# Patient Record
Sex: FEMALE | Race: WHITE | Employment: OTHER | URBAN - METROPOLITAN AREA
[De-identification: names, ages, dates, MRNs, and addresses within clinical notes are randomized per-mention and may not be internally consistent; named-entity substitution may affect disease eponyms.]

---

## 2017-01-04 NOTE — PROGRESS NOTES
Patient pre-assessment complete for [facility] poss with DR Chente Padron scheduled for 17 at 8:00am, arrival time 6:00am - HYDRATION. Patient verified using . Patient instructed to bring all home medications in labeled bottles on the day of procedure. NPO status reinforced. Patient informed to take a full dose aspirin 325mg  or 81 mg x 4 on the day of procedure. Patient instructed to HOLD lasix & tradjenta. Instructed they can take all other medications excluding vitamins & supplements. Patient verbalizes understanding of all instructions & denies any questions at this time.       Encouraged increased fluids today & informed of plan for hydration & possible delayed procedure time, voiced understanding

## 2017-01-05 ENCOUNTER — HOSPITAL ENCOUNTER (OUTPATIENT)
Dept: CARDIAC CATH/INVASIVE PROCEDURES | Age: 82
Setting detail: OBSERVATION
Discharge: HOME | End: 2017-01-06
Attending: INTERNAL MEDICINE | Admitting: INTERNAL MEDICINE
Payer: MEDICARE

## 2017-01-05 ENCOUNTER — HOSPITAL ENCOUNTER (OUTPATIENT)
Dept: CARDIAC CATH/INVASIVE PROCEDURES | Age: 82
Discharge: HOME | End: 2017-01-05
Payer: MEDICARE

## 2017-01-05 VITALS — HEART RATE: 64 BPM | DIASTOLIC BLOOD PRESSURE: 61 MMHG | SYSTOLIC BLOOD PRESSURE: 128 MMHG | RESPIRATION RATE: 20 BRPM

## 2017-01-05 PROBLEM — I25.118 CORONARY ARTERY DISEASE OF NATIVE ARTERY OF NATIVE HEART WITH STABLE ANGINA PECTORIS (HCC): Status: ACTIVE | Noted: 2017-01-05

## 2017-01-05 LAB
ANION GAP BLD CALC-SCNC: 10 MMOL/L (ref 7–16)
ATRIAL RATE: 65 BPM
ATRIAL RATE: 67 BPM
BUN SERPL-MCNC: 21 MG/DL (ref 8–23)
CALCIUM SERPL-MCNC: 9 MG/DL (ref 8.3–10.4)
CALCULATED P AXIS, ECG09: 55 DEGREES
CALCULATED P AXIS, ECG09: 58 DEGREES
CALCULATED R AXIS, ECG10: -6 DEGREES
CALCULATED R AXIS, ECG10: 4 DEGREES
CALCULATED T AXIS, ECG11: 46 DEGREES
CALCULATED T AXIS, ECG11: 52 DEGREES
CHLORIDE SERPL-SCNC: 104 MMOL/L (ref 98–107)
CO2 SERPL-SCNC: 25 MMOL/L (ref 21–32)
CREAT SERPL-MCNC: 1.46 MG/DL (ref 0.6–1)
DIAGNOSIS, 93000: NORMAL
DIAGNOSIS, 93000: NORMAL
DIASTOLIC BP, ECG02: NORMAL MMHG
DIASTOLIC BP, ECG02: NORMAL MMHG
GLUCOSE SERPL-MCNC: 148 MG/DL (ref 65–100)
P-R INTERVAL, ECG05: 148 MS
P-R INTERVAL, ECG05: 148 MS
POTASSIUM SERPL-SCNC: 4.3 MMOL/L (ref 3.5–5.1)
Q-T INTERVAL, ECG07: 428 MS
Q-T INTERVAL, ECG07: 438 MS
QRS DURATION, ECG06: 92 MS
QRS DURATION, ECG06: 94 MS
QTC CALCULATION (BEZET), ECG08: 445 MS
QTC CALCULATION (BEZET), ECG08: 462 MS
SODIUM SERPL-SCNC: 139 MMOL/L (ref 136–145)
SYSTOLIC BP, ECG01: NORMAL MMHG
SYSTOLIC BP, ECG01: NORMAL MMHG
VENTRICULAR RATE, ECG03: 65 BPM
VENTRICULAR RATE, ECG03: 67 BPM

## 2017-01-05 PROCEDURE — C1894 INTRO/SHEATH, NON-LASER: HCPCS

## 2017-01-05 PROCEDURE — 74011250637 HC RX REV CODE- 250/637: Performed by: INTERNAL MEDICINE

## 2017-01-05 PROCEDURE — 74011636320 HC RX REV CODE- 636/320: Performed by: INTERNAL MEDICINE

## 2017-01-05 PROCEDURE — C1887 CATHETER, GUIDING: HCPCS

## 2017-01-05 PROCEDURE — 74011000258 HC RX REV CODE- 258: Performed by: INTERNAL MEDICINE

## 2017-01-05 PROCEDURE — 77030015766

## 2017-01-05 PROCEDURE — 99218 HC RM OBSERVATION: CPT

## 2017-01-05 PROCEDURE — C1725 CATH, TRANSLUMIN NON-LASER: HCPCS

## 2017-01-05 PROCEDURE — 77030029997 HC DEV COM RDL R BND TELE -B

## 2017-01-05 PROCEDURE — 93458 L HRT ARTERY/VENTRICLE ANGIO: CPT

## 2017-01-05 PROCEDURE — C1769 GUIDE WIRE: HCPCS

## 2017-01-05 PROCEDURE — 92928 PRQ TCAT PLMT NTRAC ST 1 LES: CPT

## 2017-01-05 PROCEDURE — 51701 INSERT BLADDER CATHETER: CPT

## 2017-01-05 PROCEDURE — 77030004534 HC CATH ANGI DX INFN CARD -A

## 2017-01-05 PROCEDURE — 74011000250 HC RX REV CODE- 250: Performed by: INTERNAL MEDICINE

## 2017-01-05 PROCEDURE — 93005 ELECTROCARDIOGRAM TRACING: CPT | Performed by: INTERNAL MEDICINE

## 2017-01-05 PROCEDURE — 80048 BASIC METABOLIC PNL TOTAL CA: CPT | Performed by: INTERNAL MEDICINE

## 2017-01-05 PROCEDURE — G0378 HOSPITAL OBSERVATION PER HR: HCPCS

## 2017-01-05 PROCEDURE — 74011250636 HC RX REV CODE- 250/636: Performed by: INTERNAL MEDICINE

## 2017-01-05 PROCEDURE — 74011250636 HC RX REV CODE- 250/636

## 2017-01-05 PROCEDURE — C1874 STENT, COATED/COV W/DEL SYS: HCPCS

## 2017-01-05 RX ORDER — NITROGLYCERIN 0.4 MG/1
0.4 TABLET SUBLINGUAL
Status: DISCONTINUED | OUTPATIENT
Start: 2017-01-05 | End: 2017-01-06 | Stop reason: HOSPADM

## 2017-01-05 RX ORDER — NITROGLYCERIN 0.4 MG/1
0.4 TABLET SUBLINGUAL
Status: DISCONTINUED | OUTPATIENT
Start: 2017-01-05 | End: 2017-01-05 | Stop reason: SDUPTHER

## 2017-01-05 RX ORDER — DIAZEPAM 5 MG/1
5 TABLET ORAL ONCE
Status: ACTIVE | OUTPATIENT
Start: 2017-01-05 | End: 2017-01-05

## 2017-01-05 RX ORDER — SODIUM CHLORIDE 0.9 % (FLUSH) 0.9 %
5-10 SYRINGE (ML) INJECTION AS NEEDED
Status: DISCONTINUED | OUTPATIENT
Start: 2017-01-05 | End: 2017-01-06 | Stop reason: HOSPADM

## 2017-01-05 RX ORDER — MIDAZOLAM HYDROCHLORIDE 1 MG/ML
.5-2 INJECTION, SOLUTION INTRAMUSCULAR; INTRAVENOUS
Status: DISCONTINUED | OUTPATIENT
Start: 2017-01-05 | End: 2017-01-05 | Stop reason: HOSPADM

## 2017-01-05 RX ORDER — CLOPIDOGREL BISULFATE 75 MG/1
75 TABLET ORAL DAILY
Status: DISCONTINUED | OUTPATIENT
Start: 2017-01-06 | End: 2017-01-06 | Stop reason: HOSPADM

## 2017-01-05 RX ORDER — HEPARIN SODIUM 200 [USP'U]/100ML
3 INJECTION, SOLUTION INTRAVENOUS CONTINUOUS
Status: DISCONTINUED | OUTPATIENT
Start: 2017-01-05 | End: 2017-01-06 | Stop reason: HOSPADM

## 2017-01-05 RX ORDER — GUAIFENESIN 100 MG/5ML
81 LIQUID (ML) ORAL DAILY
Status: DISCONTINUED | OUTPATIENT
Start: 2017-01-06 | End: 2017-01-05 | Stop reason: SDUPTHER

## 2017-01-05 RX ORDER — CLOPIDOGREL 300 MG/1
600 TABLET, FILM COATED ORAL
Status: COMPLETED | OUTPATIENT
Start: 2017-01-05 | End: 2017-01-05

## 2017-01-05 RX ORDER — ATORVASTATIN CALCIUM 40 MG/1
80 TABLET, FILM COATED ORAL DAILY
Status: DISCONTINUED | OUTPATIENT
Start: 2017-01-06 | End: 2017-01-05 | Stop reason: SDUPTHER

## 2017-01-05 RX ORDER — FENTANYL CITRATE 50 UG/ML
25-50 INJECTION, SOLUTION INTRAMUSCULAR; INTRAVENOUS
Status: DISCONTINUED | OUTPATIENT
Start: 2017-01-05 | End: 2017-01-05 | Stop reason: HOSPADM

## 2017-01-05 RX ORDER — SODIUM CHLORIDE 0.9 % (FLUSH) 0.9 %
5-10 SYRINGE (ML) INJECTION EVERY 8 HOURS
Status: DISCONTINUED | OUTPATIENT
Start: 2017-01-05 | End: 2017-01-06 | Stop reason: HOSPADM

## 2017-01-05 RX ORDER — SODIUM CHLORIDE 9 MG/ML
1 INJECTION, SOLUTION INTRAVENOUS AS NEEDED
Status: DISPENSED | OUTPATIENT
Start: 2017-01-05 | End: 2017-01-06

## 2017-01-05 RX ORDER — LORAZEPAM 1 MG/1
1 TABLET ORAL
Status: DISCONTINUED | OUTPATIENT
Start: 2017-01-05 | End: 2017-01-06 | Stop reason: HOSPADM

## 2017-01-05 RX ORDER — ZOLPIDEM TARTRATE 5 MG/1
5 TABLET ORAL
Status: DISCONTINUED | OUTPATIENT
Start: 2017-01-05 | End: 2017-01-06 | Stop reason: HOSPADM

## 2017-01-05 RX ORDER — MAG HYDROX/ALUMINUM HYD/SIMETH 200-200-20
30 SUSPENSION, ORAL (FINAL DOSE FORM) ORAL ONCE
Status: COMPLETED | OUTPATIENT
Start: 2017-01-05 | End: 2017-01-05

## 2017-01-05 RX ORDER — CARVEDILOL 6.25 MG/1
6.25 TABLET ORAL 2 TIMES DAILY WITH MEALS
Status: DISCONTINUED | OUTPATIENT
Start: 2017-01-05 | End: 2017-01-06 | Stop reason: HOSPADM

## 2017-01-05 RX ORDER — AMLODIPINE BESYLATE 5 MG/1
5 TABLET ORAL DAILY
Status: DISCONTINUED | OUTPATIENT
Start: 2017-01-06 | End: 2017-01-06 | Stop reason: HOSPADM

## 2017-01-05 RX ORDER — ACETAMINOPHEN 325 MG/1
650 TABLET ORAL
Status: DISCONTINUED | OUTPATIENT
Start: 2017-01-05 | End: 2017-01-06 | Stop reason: HOSPADM

## 2017-01-05 RX ORDER — MORPHINE SULFATE 2 MG/ML
2 INJECTION, SOLUTION INTRAMUSCULAR; INTRAVENOUS
Status: DISCONTINUED | OUTPATIENT
Start: 2017-01-05 | End: 2017-01-06 | Stop reason: HOSPADM

## 2017-01-05 RX ORDER — ASPIRIN 81 MG/1
81 TABLET ORAL DAILY
Status: DISCONTINUED | OUTPATIENT
Start: 2017-01-06 | End: 2017-01-06 | Stop reason: HOSPADM

## 2017-01-05 RX ORDER — SODIUM CHLORIDE 9 MG/ML
75 INJECTION, SOLUTION INTRAVENOUS CONTINUOUS
Status: DISCONTINUED | OUTPATIENT
Start: 2017-01-05 | End: 2017-01-06 | Stop reason: HOSPADM

## 2017-01-05 RX ORDER — ATORVASTATIN CALCIUM 80 MG/1
80 TABLET, FILM COATED ORAL DAILY
Status: DISCONTINUED | OUTPATIENT
Start: 2017-01-06 | End: 2017-01-06 | Stop reason: HOSPADM

## 2017-01-05 RX ORDER — LIDOCAINE HYDROCHLORIDE 20 MG/ML
1-20 INJECTION, SOLUTION INFILTRATION; PERINEURAL
Status: DISCONTINUED | OUTPATIENT
Start: 2017-01-05 | End: 2017-01-06 | Stop reason: HOSPADM

## 2017-01-05 RX ORDER — GUAIFENESIN 100 MG/5ML
324 LIQUID (ML) ORAL ONCE
Status: ACTIVE | OUTPATIENT
Start: 2017-01-05 | End: 2017-01-05

## 2017-01-05 RX ADMIN — HEPARIN SODIUM 2 ML: 10000 INJECTION, SOLUTION INTRAVENOUS; SUBCUTANEOUS at 10:35

## 2017-01-05 RX ADMIN — FENTANYL CITRATE 25 MCG: 50 INJECTION, SOLUTION INTRAMUSCULAR; INTRAVENOUS at 10:31

## 2017-01-05 RX ADMIN — Medication 10 ML: at 20:48

## 2017-01-05 RX ADMIN — FENTANYL CITRATE 25 MCG: 50 INJECTION, SOLUTION INTRAMUSCULAR; INTRAVENOUS at 10:48

## 2017-01-05 RX ADMIN — SODIUM CHLORIDE 1 ML/KG/HR: 900 INJECTION, SOLUTION INTRAVENOUS at 07:24

## 2017-01-05 RX ADMIN — HEPARIN SODIUM 3 ML/HR: 200 INJECTION, SOLUTION INTRAVENOUS at 10:13

## 2017-01-05 RX ADMIN — IOPAMIDOL 175 ML: 755 INJECTION, SOLUTION INTRAVENOUS at 11:11

## 2017-01-05 RX ADMIN — ALUMINUM HYDROXIDE, MAGNESIUM HYDROXIDE, AND SIMETHICONE 30 ML: 200; 200; 20 SUSPENSION ORAL at 11:16

## 2017-01-05 RX ADMIN — LIDOCAINE HYDROCHLORIDE 60 MG: 20 INJECTION, SOLUTION INFILTRATION; PERINEURAL at 10:34

## 2017-01-05 RX ADMIN — BIVALIRUDIN 1.75 MG/KG/HR: 250 INJECTION, POWDER, LYOPHILIZED, FOR SOLUTION INTRAVENOUS at 10:45

## 2017-01-05 RX ADMIN — CARVEDILOL 6.25 MG: 6.25 TABLET ORAL at 20:43

## 2017-01-05 RX ADMIN — MIDAZOLAM HYDROCHLORIDE 1 MG: 1 INJECTION, SOLUTION INTRAMUSCULAR; INTRAVENOUS at 10:31

## 2017-01-05 RX ADMIN — CLOPIDOGREL BISULFATE 600 MG: 300 TABLET, FILM COATED ORAL at 11:15

## 2017-01-05 NOTE — PROGRESS NOTES
TRANSFER - OUT REPORT:    Verbal report given to Wisam Daniel RN on [employer]  being transferred to 328(unit) for routine post - op       Report consisted of patients Situation, Background, Assessment and   Recommendations(SBAR). Information from the following report(s) SBAR, Procedure Summary, Intake/Output, MAR, Accordion, Recent Results and Med Rec Status was reviewed with the receiving nurse. Lines:   Peripheral IV 01/05/17 Right Antecubital (Active)       Peripheral IV 01/05/17 Right Arm (Active)        Opportunity for questions and clarification was provided. Patient transported with:   Monitor  Registered Nurse       TR band off. Site dressing clean dry and intact. No hematoma and no bleeding. . Assessed at the bedside by Wisam Daniel RN for handoff of care.

## 2017-01-05 NOTE — PROGRESS NOTES
Patient received to [address] [place] [address] room # 1  Ambulatory from [facility]. Patient scheduled for LHC today with Dr Laurence Parker. Procedure reviewed & questions answered, voiced good understanding consent obtained & placed on chart. All medications and medical history reviewed. Will prep patient per orders. Patient & family updated on plan of care.     Patient states she took  mg at 0500 am

## 2017-01-05 NOTE — PROGRESS NOTES
TRANSFER - OUT REPORT:    Verbal report given to Costella Alpers, RN on [employer]  being transferred to [facility] for routine progression of care       Report consisted of patients Situation, Background, Assessment and Recommendations(SBAR). Information from the following report(s) SBAR, Kardex, Procedure Summary and MAR was reviewed with the receiving nurse. Opportunity for questions and clarification was provided.       [facility] with Dr Edwin Newman  1 versed  50 fentanyl  One LAVONNE LAD  angiomax until completion  600 plavix  30 mylanta  Right radial R band 12ml at 1110

## 2017-01-05 NOTE — PROGRESS NOTES
TRANSFER - IN REPORT:    Verbal report received from Nilo Quiros RN on Boom Linares  being received from [address] [place] [address] for routine progression of care. Report consisted of patients Situation, Background, Assessment and   Recommendations(SBAR). Information from the following reports was reviewed: Kardex, Procedure Summary, MAR and Recent Results. Opportunity for questions and clarification was provided. Assessment completed upon patients arrival to unit and care assumed. Patient received to room 328 and assessment completed. Patient connected to telemetry monitor and eagle with BP cycling every 15 minutes. Patient oriented to room and plan of care reviewed. Patient voiced understanding of keeping wrist relatively still. Right radial site benign, dressing dry and intact, no hematoma. Patient aware to use call light to communicate needs. Instructed patient to not use arm for any pushing or pulling. Admission skin assessment completed with second RN and reveals the following: gauze and tegaderm on right wrist.  Clean dry and intact without hematoma. Pt coccyx and heels are without redness or breakdown.

## 2017-01-05 NOTE — PROGRESS NOTES
Report received from [facility] RN. Procedural findings communicated. Intra procedural  medication administration reviewed. Progression of care discussed. Patient received into [facility] [place] [facility] post PCI.      Access site without bleeding or swelling yes    Dressing dry and intact yes    Patient instructed to limit movement to right upper extremity    Routine post procedural vital signs and site assessment initiated yes

## 2017-01-05 NOTE — PROGRESS NOTES
Pt own meds taken to pharmacy and the ones that were not ordered were returned to family. Pt has family in room and has completed dinner.   Carvedilol will be re timed as med is in pharmacy being checked and is unavailable

## 2017-01-05 NOTE — IP AVS SNAPSHOT
Rajeshrohit Deborah 
 
 
 [address] [facility]  [address] 
[phone] Patient: Ginna Farfan MRN: [id number] SBC:[birth year] You are allergic to the following No active allergies Recent Documentation Height Weight Breastfeeding? BMI Smoking Status 1.6 m 72.7 kg No 28.4 kg/m2 Former Smoker Emergency Contacts Name Discharge Info Relation Home Work Mobile Yue Huynh  Other Relative [6] [phone] About your hospitalization You were admitted on:  January 5, 2017 You last received care in the:  [facility] 3 CLINICAL OBSERVATION You were discharged on:  January 6, 2017 Unit phone number:  [phone] Why you were hospitalized Your primary diagnosis was:  Coronary Artery Disease Of Native Artery Of Native Heart With Stable Angina Pectoris (Hcc) Your diagnoses also included:  Dyslipidemia, Diabetes Mellitus (Hcc), Chronic Systolic Heart Failure (Hcc) Providers Seen During Your Hospitalizations Provider Role Specialty Primary office phone Isaias Mcbride MD Attending Provider Cardiology [phone] Your Primary Care Physician (PCP) Primary Care Physician Office Phone Office Fax Emmanuel Prince [phone] Follow-up Information Follow up With Details Comments Contact Info Mariella Garza MD On 1/19/2017 at 10:20am [address] [place] [address] [id number] [place] [address] 160 
[phone] Isaias Mcbride MD On 1/30/2017 Monday 1/30 at [facility] office [address] [place] [address] 
[phone] Your Appointments Monday January 30, 2017  9:00 AM [facility] HOSPITAL FOLLOW-UP with Isaias Mcbride MD  
[facility] Cardiology ([address] [place] [address]) 2 Levine, Susan. \Hospital Has a New Name and Outlook.\"" 
[address] [place] [address]  
[phone] Current Discharge Medication List  
  
START taking these medications Dose & Instructions Dispensing Information Comments Morning Noon Evening Bedtime  
 clopidogrel 75 mg Tab Commonly known as:  PLAVIX Your next dose is: Today, Tomorrow Other:  _________ Dose:  75 mg Take 1 Tab by mouth daily. Quantity:  30 Tab Refills:  11 CONTINUE these medications which have NOT CHANGED Dose & Instructions Dispensing Information Comments Morning Noon Evening Bedtime  
 amLODIPine 5 mg tablet Commonly known as:  Catarina Colon Your next dose is: Today, Tomorrow Other:  _________ Dose:  5 mg Take 1 Tab by mouth daily. Quantity:  30 Tab Refills:  11  
     
   
   
   
  
 aspirin 81 mg chewable tablet Your next dose is: Today, Tomorrow Other:  _________ Dose:  81 mg Take 81 mg by mouth daily. Refills:  0  
     
   
   
   
  
 atorvastatin 80 mg tablet Commonly known as:  LIPITOR Your next dose is: Today, Tomorrow Other:  _________ Dose:  80 mg Take 1 Tab by mouth daily. Quantity:  90 Tab Refills:  3  
     
   
   
   
  
 carvedilol 6.25 mg tablet Commonly known as:  Cori Florissant Your next dose is: Today, Tomorrow Other:  _________ Dose:  6.25 mg Take 1 Tab by mouth two (2) times daily (with meals). Quantity:  180 Tab Refills:  3 cholecalciferol 1,000 unit Cap Commonly known as:  VITAMIN D3 Your next dose is: Today, Tomorrow Other:  _________ Take  by mouth daily. Refills:  0  
     
   
   
   
  
 furosemide 40 mg tablet Commonly known as:  LASIX Your next dose is: Today, Tomorrow Other:  _________ Dose:  40 mg Take 1 Tab by mouth. daily Quantity:  30 Tab Refills:  11  
     
   
   
   
  
 linagliptin 5 mg tablet Commonly known as:  Sandy Plump Your next dose is: Today, Tomorrow Other:  _________ Dose:  5 mg Take 5 mg by mouth daily. Refills:  0  
     
   
   
   
  
 nitroglycerin 0.4 mg SL tablet Commonly known as:  NITROSTAT Your next dose is: Today, Tomorrow Other:  _________ Dose:  0.4 mg  
1 Tab by SubLINGual route every five (5) minutes as needed for Chest Pain. Quantity:  100 Tab Refills:  prn  
     
   
   
   
  
 zolpidem 5 mg tablet Commonly known as:  AMBIEN Your next dose is: Today, Tomorrow Other:  _________ Take  by mouth nightly as needed for Sleep. Refills:  0 Where to Get Your Medications Information on where to get these meds will be given to you by the nurse or doctor. ! Ask your nurse or doctor about these medications  
  clopidogrel 75 mg Tab Discharge Instructions DISCHARGE SUMMARY from Nurse The following personal items are in your possession at time of discharge: 
 
Dental Appliances: Lowers, Uppers, With patient Visual Aid: None Hearing Aids/Status: At home Home Medications: Sent to pharmacy Other Valuables: Cell Phone PATIENT INSTRUCTIONS: 
 
 
F-face looks uneven A-arms unable to move or move unevenly S-speech slurred or non-existent T-time-call 911 as soon as signs and symptoms begin-DO NOT go Back to bed or wait to see if you get better-TIME IS BRAIN. Warning Signs of HEART ATTACK Call 911 if you have these symptoms: 
? Chest discomfort.  Most heart attacks involve discomfort in the center of the chest that lasts more than a few minutes, or that goes away and comes back. It can feel like uncomfortable pressure, squeezing, fullness, or pain. ? Discomfort in other areas of the upper body. Symptoms can include pain or discomfort in one or both arms, the back, neck, jaw, or stomach. ? Shortness of breath with or without chest discomfort. ? Other signs may include breaking out in a cold sweat, nausea, or lightheadedness. Don't wait more than five minutes to call [address]! Fast action can save your life. Calling 911 is almost always the fastest way to get lifesaving treatment. Emergency Medical Services staff can begin treatment when they arrive  up to an hour sooner than if someone gets to the hospital by car. The discharge information has been reviewed with the patient. The patient verbalized understanding. Discharge medications reviewed with the patient and appropriate educational materials and side effects teaching were provided. Percutaneous Coronary Intervention: What to Expect at [facility] Percutaneous coronary intervention (PCI) is the name for procedures that are used to open a narrowed or blocked coronary artery. The two most common PCI procedures are coronary angioplasty and coronary stent placement. Your groin or arm may have a bruise and feel sore for a day or two after a percutaneous coronary intervention (PCI). You can do light activities around the house, but nothing strenuous for several days. This care sheet gives you a general idea about how long it will take for you to recover. But each person recovers at a different pace. Follow the steps below to get better as quickly as possible. How can you care for yourself at home? Activity · Do not do strenuous exercise and do not lift, pull, or push anything heavy until your doctor says it is okay. This may be for a day or two. You can walk around the house and do light activity, such as cooking.  
· You may shower 24 to 48 hours after the procedure, if your doctor okays it. Pat the incision dry. Do not take a bath for 1 week, or until your doctor tells you it is okay. · If the catheter was placed in your groin, try not to walk up stairs for the first couple of days. · If the catheter was placed in your arm near your wrist, do not bend your wrist deeply for the first couple of days. Be careful using your hand to get into and out of a chair or bed. · If your doctor recommends it, get more exercise. Walking is a good choice. Bit by bit, increase the amount you walk every day. Try for at least 30 minutes on most days of the week. Diet · Drink plenty of fluids to help your body flush out the dye. If you have kidney, heart, or liver disease and have to limit fluids, talk with your doctor before you increase the amount of fluids you drink. · Keep eating a heart-healthy diet that has lots of fruits, vegetables, and whole grains. If you have not been eating this way, talk to your doctor. You also may want to talk to a dietitian. This expert can help you to learn about healthy foods and plan meals. Medicines · Your doctor will tell you if and when you can restart your medicines. He or she will also give you instructions about taking any new medicines. · If you take blood thinners, such as warfarin (Coumadin), clopidogrel (Plavix), or aspirin, be sure to talk to your doctor. He or she will tell you if and when to start taking those medicines again. Make sure that you understand exactly what your doctor wants you to do. · Your doctor will prescribe blood-thinning medicines. You will likely take aspirin plus another antiplatelet, such as clopidogrel (Plavix). It is very important that you take these medicines exactly as directed. These medicines help keep the coronary artery open and reduce your risk of a heart attack. · Call your doctor if you think you are having a problem with your medicine. Care of the catheter site · For 1 or 2 days, keep a bandage over the spot where the catheter was inserted. The bandage probably will fall off in this time. · Put ice or a cold pack on the area for 10 to 20 minutes at a time to help with soreness or swelling. Put a thin cloth between the ice and your skin. Follow-up care is a key part of your treatment and safety. Be sure to make and go to all appointments, and call your doctor if you are having problems. It's also a good idea to know your test results and keep a list of the medicines you take. When should you call for help? Call 911 anytime you think you may need emergency care. For example, call if: 
· You passed out (lost consciousness). · You have severe trouble breathing. · You have sudden chest pain and shortness of breath, or you cough up blood. · You have symptoms of a heart attack, such as: ¨ Chest pain or pressure. ¨ Sweating. ¨ Shortness of breath. ¨ Nausea or vomiting. ¨ Pain that spreads from the chest to the neck, jaw, or one or both shoulders or arms. ¨ Dizziness or lightheadedness. ¨ A fast or uneven pulse. After calling 911, chew 1 adult-strength aspirin. Wait for an ambulance. Do not try to drive yourself. · You have been diagnosed with angina, and you have angina symptoms that do not go away with rest or are not getting better within 5 minutes after you take one dose of nitroglycerin. Call your doctor now or seek immediate medical care if: 
· You are bleeding from the area where the catheter was put in your artery. · You have a fast-growing, painful lump at the catheter site. · You have signs of infection, such as: 
¨ Increased pain, swelling, warmth, or redness. ¨ Red streaks leading from the catheter site. ¨ Pus draining from the catheter site. ¨ A fever. · Your leg or arm looks blue or feels cold, numb, or tingly. Watch closely for changes in your health, and be sure to contact your doctor if you have any problems. Where can you learn more? Go to http://rochelle-lindsay.info/. Enter X745 in the search box to learn more about \"Percutaneous Coronary Intervention: What to Expect at Home. \" Current as of: January 27, 2016 Content Version: 11.1 © 5828-0261 [employer]. Care instructions adapted under license by [employer] (which disclaims liability or warranty for this information). If you have questions about a medical condition or this instruction, always ask your healthcare professional. [address] any warranty or liability for your use of this information. Avoiding Triggers With Heart Failure: Care Instructions Your Care Instructions Triggers are anything that make your heart failure flare up. A flare-up is also called \"sudden heart failure\" or \"acute heart failure. \" When you have a flare-up, fluid builds up in your lungs, and you have problems breathing. You might need to go to the hospital. By watching for changes in your condition and avoiding triggers, you can prevent heart failure flare-ups. Follow-up care is a key part of your treatment and safety. Be sure to make and go to all appointments, and call your doctor if you are having problems. It's also a good idea to know your test results and keep a list of the medicines you take. How can you care for yourself at home? Watch for changes in your weight and condition · Weigh yourself without clothing at the same time each day. Record your weight. Call your doctor if you gain 3 pounds or more in 2 to 3 days. A sudden weight gain may mean that your heart failure is getting worse. · Keep a daily record of your symptoms. Write down any changes in how you feel, such as new shortness of breath, cough, or problems eating. Also record if your ankles are more swollen than usual and if you have to urinate in the night more often. Note anything that you ate or did that could have triggered these changes. Limit sodium Sodium causes your body to hold on to water, making it harder for your heart to pump. People get most of their sodium from processed foods. Fast food and restaurant meals also tend to be very high in sodium. · Your doctor may suggest that you limit sodium to 2,000 milligrams (mg) a day or less. That is less than 1 teaspoon of salt a day, including all the salt you eat in cooking or in packaged foods. · Read food labels on cans and food packages. They tell you how much sodium you get in one serving. Check the serving size. If you eat more than one serving, you are getting more sodium. · Be aware that sodium can come in forms other than salt, including monosodium glutamate (MSG), sodium citrate, and sodium bicarbonate (baking soda). MSG is often added to Asian food. You can sometimes ask for food without MSG or salt. · Slowly reducing salt will help you adjust to the taste. Take the salt shaker off the table. · Flavor your food with garlic, lemon juice, onion, vinegar, herbs, and spices instead of salt. Do not use soy sauce, steak sauce, onion salt, garlic salt, mustard, or ketchup on your food, unless it is labeled \"low-sodium\" or \"low-salt. \" 
· Make your own salad dressings, sauces, and ketchup without adding salt. · Use fresh or frozen ingredients, instead of canned ones, whenever you can. Choose low-sodium canned goods. · Eat less processed food and food from restaurants, including fast food. Exercise as directed Moderate, regular exercise is very good for your heart. It improves your blood flow and helps control your weight. But too much exercise can stress your heart and cause a heart failure flare-up. · Check with your doctor before you start an exercise program. 
· Walking is an easy way to get exercise. Start out slowly. Gradually increase the length and pace of your walk. Swimming, riding a bike, and using a treadmill are also good forms of exercise. · When you exercise, watch for signs that your heart is working too hard. You are pushing yourself too hard if you cannot talk while you are exercising. If you become short of breath or dizzy or have chest pain, stop, sit down, and rest. 
· Do not exercise when you do not feel well. Take medicines correctly · Take your medicines exactly as prescribed. Call your doctor if you think you are having a problem with your medicine. · Make a list of all the medicines you take. Include those prescribed to you by other doctors and any over-the-counter medicines, vitamins, or supplements you take. Take this list with you when you go to any doctor. · Take your medicines at the same time every day. It may help you to post a list of all the medicines you take every day and what time of day you take them. · Make taking your medicine as simple as you can. Plan times to take your medicines when you are doing other things, such as eating a meal or getting ready for bed. This will make it easier to remember to take your medicines. · Get organized. Use helpful tools, such as daily or weekly pill containers. When should you call for help? Call 911 if you have symptoms of sudden heart failure such as: 
· You have severe trouble breathing. · You cough up pink, foamy mucus. · You have a new irregular or rapid heartbeat. Call your doctor now or seek immediate medical care if: 
· You have new or increased shortness of breath. · You are dizzy or lightheaded, or you feel like you may faint. · You have sudden weight gain, such as 3 pounds or more in 2 to 3 days. · You have increased swelling in your legs, ankles, or feet. · You are suddenly so tired or weak that you cannot do your usual activities. Watch closely for changes in your health, and be sure to contact your doctor if you develop new symptoms. Where can you learn more? Go to http://rochelle-lindsay.info/. Enter Z803 in the search box to learn more about \"Avoiding Triggers With Heart Failure: Care Instructions. \" Current as of: April 27, 2016 Content Version: 11.1 © 2006-2016 [employer]. Care instructions adapted under license by [employer] (which disclaims liability or warranty for this information). If you have questions about a medical condition or this instruction, always ask your healthcare professional. [address] any warranty or liability for your use of this information. Discharge Orders None I-frontdesk Announcement We are excited to announce that we are making your provider's discharge notes available to you in I-frontdesk. You will see these notes when they are completed and signed by the physician that discharged you from your recent hospital stay. If you have any questions or concerns about any information you see in I-frontdesk, please call the Health Information Department where you were seen or reach out to your Primary Care Provider for more information about your plan of care. General Information Please provide this summary of care documentation to your next provider. Patient Signature:  ____________________________________________________________ Date:  ____________________________________________________________  
  
[facility] Provider Signature:  ____________________________________________________________ Date:  ____________________________________________________________

## 2017-01-05 NOTE — PROCEDURES
[facility] M[facility] 44       Name:  Ayla Hand   MR#:  [id number]   :  1933   Account #:  [de-identified]   Date of Adm:  2017       DATE OF PROCEDURE: 2017    CLINICAL INFORMATION: Patient with progressively worsening   angina, Phoenix class 3-4 now, worse with exertion, better with   rest chest pain and shortness breath with known to mid LAD   stenosis that has been managed medically, but in spite of   maximum medical therapy, patient having increased angina. PROCEDURE DETAILS: After informed consent was obtained, a 6-  Mohawk sheath was placed in the right radial artery. A radial   cocktail was given by protocol. A 5-Mohawk Tiger catheter and   angled pigtail were used. TR band was used at the end of the   procedure. FINDINGS: The left main coronary artery is in normal anatomic   position, free of significant disease. It bifurcates into LAD   and circumflex system. The LAD has 20% proximal narrowing in the mid portion of the   vessel after the takeoff of a large diagonal branch. There is an   85% narrowing of the distal LAD and diagonal branch is small but   free of any high-grade narrowing. The large mid vessel diagonal   branch is free of any high-grade narrowing. The circumflex is a large vessel terminating in a large distal   obtuse marginal with a small more proximal obtuse marginal   branch, there is no atherosclerosis seen in the circumflex   system. The right coronary artery is a dominant vessel in normal   anatomic position. There is diffuse narrowing up to 20% to 30%   in the proximal segment. The PDA and posterolateral obtuse   marginal branches are small and free of significant disease. Left ventricular function appears improved from previous cath   films and ejection fraction is now 55. Left ventricular end-  diastolic pressure is 9 mmHg. Opening aortic pressure is 144/67. There is no gradient across the aortic valve. After Angiomax was given, a Prowater wire was placed in the   distal LAD. The lesion was predilated with 2.5 x 12 NC TREK   stented with a 2.5 x 12 Synergy drug-eluting stent and then   postdilated with the same 2.5 NC balloon with inflations to 14   atmospheres. There was 0% residual and GUERA-3 flow. CONCLUSION:   1. Successful angioplasty and stenting of the mid left anterior   descending. 2. Preserved left ventricular systolic function, which has   improved from previous cath film.         MD KRYSTEN Ewing / Chandni Gallegos   D:  01/05/2017   11:28   T:  01/05/2017   11:40   Job #:  [id number]

## 2017-01-06 VITALS
TEMPERATURE: 97.6 F | WEIGHT: 160.3 LBS | DIASTOLIC BLOOD PRESSURE: 54 MMHG | HEIGHT: 63 IN | SYSTOLIC BLOOD PRESSURE: 120 MMHG | BODY MASS INDEX: 28.4 KG/M2 | OXYGEN SATURATION: 94 % | RESPIRATION RATE: 16 BRPM | HEART RATE: 75 BPM

## 2017-01-06 LAB
ANION GAP BLD CALC-SCNC: 8 MMOL/L (ref 7–16)
ATRIAL RATE: 69 BPM
BASOPHILS # BLD AUTO: 0 K/UL (ref 0–0.2)
BASOPHILS # BLD: 0 % (ref 0–2)
BUN SERPL-MCNC: 19 MG/DL (ref 8–23)
CALCIUM SERPL-MCNC: 8.9 MG/DL (ref 8.3–10.4)
CALCULATED P AXIS, ECG09: 73 DEGREES
CALCULATED R AXIS, ECG10: 12 DEGREES
CALCULATED T AXIS, ECG11: 67 DEGREES
CHLORIDE SERPL-SCNC: 107 MMOL/L (ref 98–107)
CHOLEST SERPL-MCNC: 130 MG/DL
CO2 SERPL-SCNC: 28 MMOL/L (ref 21–32)
CREAT SERPL-MCNC: 1.27 MG/DL (ref 0.6–1)
DIAGNOSIS, 93000: NORMAL
DIASTOLIC BP, ECG02: NORMAL MMHG
DIFFERENTIAL METHOD BLD: ABNORMAL
EOSINOPHIL # BLD: 0.2 K/UL (ref 0–0.8)
EOSINOPHIL NFR BLD: 3 % (ref 0.5–7.8)
ERYTHROCYTE [DISTWIDTH] IN BLOOD BY AUTOMATED COUNT: 15 % (ref 11.9–14.6)
GLUCOSE SERPL-MCNC: 115 MG/DL (ref 65–100)
HCT VFR BLD AUTO: 37.4 % (ref 35.8–46.3)
HDLC SERPL-MCNC: 32 MG/DL (ref 40–60)
HDLC SERPL: 4.1 {RATIO}
HGB BLD-MCNC: 11.9 G/DL (ref 11.7–15.4)
IMM GRANULOCYTES # BLD: 0 K/UL (ref 0–0.5)
IMM GRANULOCYTES NFR BLD AUTO: 0.2 % (ref 0–5)
LDLC SERPL CALC-MCNC: 58.4 MG/DL
LIPID PROFILE,FLP: ABNORMAL
LYMPHOCYTES # BLD AUTO: 35 % (ref 13–44)
LYMPHOCYTES # BLD: 2.2 K/UL (ref 0.5–4.6)
MCH RBC QN AUTO: 27.5 PG (ref 26.1–32.9)
MCHC RBC AUTO-ENTMCNC: 31.8 G/DL (ref 31.4–35)
MCV RBC AUTO: 86.4 FL (ref 79.6–97.8)
MONOCYTES # BLD: 0.4 K/UL (ref 0.1–1.3)
MONOCYTES NFR BLD AUTO: 7 % (ref 4–12)
NEUTS SEG # BLD: 3.5 K/UL (ref 1.7–8.2)
NEUTS SEG NFR BLD AUTO: 55 % (ref 43–78)
P-R INTERVAL, ECG05: 152 MS
PLATELET # BLD AUTO: 267 K/UL (ref 150–450)
PMV BLD AUTO: 10.1 FL (ref 10.8–14.1)
POTASSIUM SERPL-SCNC: 4.3 MMOL/L (ref 3.5–5.1)
Q-T INTERVAL, ECG07: 410 MS
QRS DURATION, ECG06: 94 MS
QTC CALCULATION (BEZET), ECG08: 439 MS
RBC # BLD AUTO: 4.33 M/UL (ref 4.05–5.25)
SODIUM SERPL-SCNC: 143 MMOL/L (ref 136–145)
SYSTOLIC BP, ECG01: NORMAL MMHG
TRIGL SERPL-MCNC: 198 MG/DL (ref 35–150)
VENTRICULAR RATE, ECG03: 69 BPM
VLDLC SERPL CALC-MCNC: 39.6 MG/DL (ref 6–23)
WBC # BLD AUTO: 6.3 K/UL (ref 4.3–11.1)

## 2017-01-06 PROCEDURE — 99218 HC RM OBSERVATION: CPT

## 2017-01-06 PROCEDURE — 36415 COLL VENOUS BLD VENIPUNCTURE: CPT | Performed by: INTERNAL MEDICINE

## 2017-01-06 PROCEDURE — 74011250637 HC RX REV CODE- 250/637: Performed by: INTERNAL MEDICINE

## 2017-01-06 PROCEDURE — G0378 HOSPITAL OBSERVATION PER HR: HCPCS

## 2017-01-06 PROCEDURE — 80048 BASIC METABOLIC PNL TOTAL CA: CPT | Performed by: INTERNAL MEDICINE

## 2017-01-06 PROCEDURE — 85025 COMPLETE CBC W/AUTO DIFF WBC: CPT | Performed by: INTERNAL MEDICINE

## 2017-01-06 PROCEDURE — 93005 ELECTROCARDIOGRAM TRACING: CPT | Performed by: INTERNAL MEDICINE

## 2017-01-06 PROCEDURE — 80061 LIPID PANEL: CPT | Performed by: INTERNAL MEDICINE

## 2017-01-06 RX ORDER — CLOPIDOGREL BISULFATE 75 MG/1
75 TABLET ORAL DAILY
Qty: 30 TAB | Refills: 11 | Status: SHIPPED | OUTPATIENT
Start: 2017-01-06 | End: 2017-05-04 | Stop reason: SDUPTHER

## 2017-01-06 RX ADMIN — CARVEDILOL 6.25 MG: 6.25 TABLET ORAL at 08:34

## 2017-01-06 RX ADMIN — AMLODIPINE BESYLATE 5 MG: 5 TABLET ORAL at 08:34

## 2017-01-06 RX ADMIN — Medication 10 ML: at 05:56

## 2017-01-06 RX ADMIN — ASPIRIN 81 MG: 81 TABLET ORAL at 08:33

## 2017-01-06 RX ADMIN — CLOPIDOGREL BISULFATE 75 MG: 75 TABLET ORAL at 08:32

## 2017-01-06 NOTE — PROGRESS NOTES
Discharge instructions reviewed with patient. Prescriptions given for Plavix and med info sheets provided for all new medications. Opportunity for questions provided. Patient voiced understanding of all discharge instructions. Patient awaiting transport to discharge area.

## 2017-01-06 NOTE — DISCHARGE SUMMARY
[facility] Cardiology Discharge Summary     Patient ID:  Eric More  [id number]  80 y.o.  2/8/1933    Admit date: 1/5/2017    Discharge date and time:  1-6-2017    Admitting Physician: Solomon Reyes MD     Discharge Physician: Mike Cabrera PA-C/Dr. Maryana Polk    Admission Diagnoses: Chest pain [R07.9]    Discharge Diagnoses:   Patient Active Problem List    Diagnosis Date Noted    Coronary artery disease of native artery of native heart with stable angina pectoris ([facility] [address] 01/05/2017    Palpitations 01/28/2016    Pulmonary hypertension ([facility] [address] 10/27/2011    Chronic systolic heart failure ([facility] [address] 10/27/2011    Chest pain, unspecified 10/25/2011    Shortness of breath 10/25/2011    Dyslipidemia 10/25/2011    Diabetes mellitus ([facility] [address] 10/25/2011    Anemia 10/25/2011       Cardiology Procedures this admission:  Left heart catheterization with PCI  Consults: None    Hospital Course: Pt was admitted with complaints of worsening exertional chest pain with known LAD disease previously treated medically due to anemia. Anemia has resolved and Pt was scheduled [place] [facility] [place] [facility] [place]. [place] [facility] and was taken to the cath lab by Dr. Maryana Polk. Pt was found to have a LAD 85% stenosis that was stented with a 2.26t49uj [employer] LAVONNE with 0% residual stenosis. Pt tolerated the procedure well and was taken to the telemetry floor for recovery. The following morning Pt was up feeling well without any complaints of CP, SOB or palpitations. Pt's right radial cath site was clean, dry and intact without hematoma or bruit. Pt's labs were WNL. Pt was seen and examined by Dr. Maryana Polk and determined stable and ready for discharge. Pt was instructed on the importance of taking aspirin and plavix/effient everyday without missing a dose. Pt will need to take dual antiplatelet therapy for at least one year.   DISPOSITION: The patient is being discharged home in stable condition on a low saturated fat, low cholesterol and low salt diet. Pt is instructed to advance activities as tolerated limited to fatigue or shortness of breath. Pt is instructed to do no heavy lifting, straining, stooping or squatting for 5 days. Pt is instructed to watch cath site for bleeding/oozing, if seen Pt is instructed to apply firm pressure with clean cloth and call office at [phone]. Pt is instructed to watch for signs of infection which include increasing area of redness around site, fever/hot to touch or purulent drainage. Pt is instructed not to soak in a tub bath for 1 week, but it is okay to shower. Pt is instructed to call office or return to ER for immediate evaluation of any shortness of breath or chest pain not relieved by NTG. Follow up with [facility] Dr. Maryana Polk in 2 weeks  Pt is being referred to out patient cardiac rehab. Discharge Exam:   Visit Vitals    /54 (BP 1 Location: Right arm, BP Patient Position: At rest)    Pulse 75    Temp 97.6 °F (36.4 °C)    Resp 16    Ht 5' 3\" (1.6 m)    Wt 72.7 kg (160 lb 4.8 oz)    SpO2 94%    Breastfeeding No    BMI 28.4 kg/m2    Pt has been seen by Dr. Maryana Polk: see his progress note for exam details.     Recent Results (from the past 24 hour(s))   EKG, 12 LEAD, INITIAL    Collection Time: 01/05/17 12:15 PM   Result Value Ref Range    Systolic BP  mmHg    Diastolic BP  mmHg    Ventricular Rate 67 BPM    Atrial Rate 67 BPM    P-R Interval 148 ms    QRS Duration 92 ms    Q-T Interval 438 ms    QTC Calculation (Bezet) 462 ms    Calculated P Axis 58 degrees    Calculated R Axis -6 degrees    Calculated T Axis 46 degrees    Diagnosis       Normal sinus rhythm  Normal ECG  Confirmed by ANGLE WALKER (), SOHAIL TOLEDO ([id number]) on 1/5/2017 1:24:56 PM     EKG, 12 LEAD, INITIAL    Collection Time: 01/06/17  6:08 AM   Result Value Ref Range    Systolic BP  mmHg    Diastolic BP  mmHg    Ventricular Rate 69 BPM    Atrial Rate 69 BPM    P-R Interval 152 ms    QRS Duration 94 ms    Q-T Interval 410 ms    QTC Calculation (Bezet) 439 ms    Calculated P Axis 73 degrees    Calculated R Axis 12 degrees    Calculated T Axis 67 degrees    Diagnosis       Normal sinus rhythm  Normal ECG  Confirmed by Tamara Otoole MD (), CRISTINA QUINTANA ([id number]) on 1/6/2017 6:43:34 AM     CBC WITH AUTOMATED DIFF    Collection Time: 01/06/17  6:37 AM   Result Value Ref Range    WBC 6.3 4.3 - 11.1 K/uL    RBC 4.33 4.05 - 5.25 M/uL    HGB 11.9 11.7 - 15.4 g/dL    HCT 37.4 35.8 - 46.3 %    MCV 86.4 79.6 - 97.8 FL    MCH 27.5 26.1 - 32.9 PG    MCHC 31.8 31.4 - 35.0 g/dL    RDW 15.0 (H) 11.9 - 14.6 %    PLATELET 517 512 - 893 K/uL    MPV 10.1 (L) 10.8 - 14.1 FL    DF AUTOMATED      NEUTROPHILS 55 43 - 78 %    LYMPHOCYTES 35 13 - 44 %    MONOCYTES 7 4.0 - 12.0 %    EOSINOPHILS 3 0.5 - 7.8 %    BASOPHILS 0 0.0 - 2.0 %    IMMATURE GRANULOCYTES 0.2 0.0 - 5.0 %    ABS. NEUTROPHILS 3.5 1.7 - 8.2 K/UL    ABS. LYMPHOCYTES 2.2 0.5 - 4.6 K/UL    ABS. MONOCYTES 0.4 0.1 - 1.3 K/UL    ABS. EOSINOPHILS 0.2 0.0 - 0.8 K/UL    ABS. BASOPHILS 0.0 0.0 - 0.2 K/UL    ABS. IMM. GRANS. 0.0 0.0 - 0.5 K/UL         Patient Instructions:   Current Discharge Medication List      START taking these medications    Details   clopidogrel (PLAVIX) 75 mg tab Take 1 Tab by mouth daily. Qty: 30 Tab, Refills: 11         CONTINUE these medications which have NOT CHANGED    Details   amLODIPine (NORVASC) 5 mg tablet Take 1 Tab by mouth daily. Qty: 30 Tab, Refills: 11      carvedilol (COREG) 6.25 mg tablet Take 1 Tab by mouth two (2) times daily (with meals). Qty: 180 Tab, Refills: 3    Associated Diagnoses: Chronic systolic heart failure (HCC)      linagliptin (TRADJENTA) 5 mg tablet Take 5 mg by mouth daily. zolpidem (AMBIEN) 5 mg tablet Take  by mouth nightly as needed for Sleep. cholecalciferol (VITAMIN D3) 1,000 unit cap Take  by mouth daily. aspirin 81 mg chewable tablet Take 81 mg by mouth daily. furosemide (LASIX) 40 mg tablet Take 1 Tab by mouth. daily  Qty: 30 Tab, Refills: 11      atorvastatin (LIPITOR) 80 mg tablet Take 1 Tab by mouth daily. Qty: 90 Tab, Refills: 3    Associated Diagnoses: Dyslipidemia      nitroglycerin (NITROSTAT) 0.4 mg SL tablet 1 Tab by SubLINGual route every five (5) minutes as needed for Chest Pain.   Qty: 100 Tab, Refills: prn               Signed:  Zack Garcia PA-C  1/6/2017  7:29 AM

## 2017-01-06 NOTE — DISCHARGE INSTRUCTIONS
DISCHARGE SUMMARY from Nurse    The following personal items are in your possession at time of discharge:    Dental Appliances: Lowers, Uppers, With patient  Visual Aid: None  Hearing Aids/Status: At home  Home Medications: Sent to pharmacy        Other Valuables: Cell Phone             PATIENT INSTRUCTIONS:    After general anesthesia or intravenous sedation, for 24 hours or while taking prescription Narcotics:  · Limit your activities  · Do not drive and operate hazardous machinery  · Do not make important personal or business decisions  · Do  not drink alcoholic beverages  · If you have not urinated within 8 hours after discharge, please contact your surgeon on call. Report the following to your surgeon:  · Excessive pain, swelling, redness or odor of or around the surgical area  · Temperature over 100.5  · Nausea and vomiting lasting longer than 4 hours or if unable to take medications  · Any signs of decreased circulation or nerve impairment to extremity: change in color, persistent  numbness, tingling, coldness or increase pain  · Any questions        What to do at Home:  Recommended activity: No lifting, Driving, or Strenuous exercise for 5 days    If you experience any of the following symptoms chest pain, shortness of breath, drainage at puncture site, weight gain of 3 pounds overnight or 5 pounds in a week, please follow up with [address] [facility]. *  Please give a list of your current medications to your Primary Care Provider. *  Please update this list whenever your medications are discontinued, doses are      changed, or new medications (including over-the-counter products) are added. *  Please carry medication information at all times in case of emergency situations.           These are general instructions for a healthy lifestyle:    No smoking/ No tobacco products/ Avoid exposure to second hand smoke    Surgeon General's Warning:  Quitting smoking now greatly reduces serious risk to your health. Obesity, smoking, and sedentary lifestyle greatly increases your risk for illness    A healthy diet, regular physical exercise & weight monitoring are important for maintaining a healthy lifestyle    You may be retaining fluid if you have a history of heart failure or if you experience any of the following symptoms:  Weight gain of 3 pounds or more overnight or 5 pounds in a week, increased swelling in our hands or feet or shortness of breath while lying flat in bed. Please call your doctor as soon as you notice any of these symptoms; do not wait until your next office visit. Recognize signs and symptoms of STROKE:    F-face looks uneven    A-arms unable to move or move unevenly    S-speech slurred or non-existent    T-time-call 911 as soon as signs and symptoms begin-DO NOT go       Back to bed or wait to see if you get better-TIME IS BRAIN. Warning Signs of HEART ATTACK     Call 911 if you have these symptoms:   Chest discomfort. Most heart attacks involve discomfort in the center of the chest that lasts more than a few minutes, or that goes away and comes back. It can feel like uncomfortable pressure, squeezing, fullness, or pain.  Discomfort in other areas of the upper body. Symptoms can include pain or discomfort in one or both arms, the back, neck, jaw, or stomach.  Shortness of breath with or without chest discomfort.  Other signs may include breaking out in a cold sweat, nausea, or lightheadedness. Don't wait more than five minutes to call 911 - MINUTES MATTER! Fast action can save your life. Calling 911 is almost always the fastest way to get lifesaving treatment. Emergency Medical Services staff can begin treatment when they arrive -- up to an hour sooner than if someone gets to the hospital by car. The discharge information has been reviewed with the patient. The patient verbalized understanding.     Discharge medications reviewed with the patient and appropriate educational materials and side effects teaching were provided. Percutaneous Coronary Intervention: What to Expect at [facility]    Percutaneous coronary intervention (PCI) is the name for procedures that are used to open a narrowed or blocked coronary artery. The two most common PCI procedures are coronary angioplasty and coronary stent placement. Your groin or arm may have a bruise and feel sore for a day or two after a percutaneous coronary intervention (PCI). You can do light activities around the house, but nothing strenuous for several days. This care sheet gives you a general idea about how long it will take for you to recover. But each person recovers at a different pace. Follow the steps below to get better as quickly as possible. How can you care for yourself at home? Activity  · Do not do strenuous exercise and do not lift, pull, or push anything heavy until your doctor says it is okay. This may be for a day or two. You can walk around the house and do light activity, such as cooking. · You may shower 24 to 48 hours after the procedure, if your doctor okays it. Pat the incision dry. Do not take a bath for 1 week, or until your doctor tells you it is okay. · If the catheter was placed in your groin, try not to walk up stairs for the first couple of days. · If the catheter was placed in your arm near your wrist, do not bend your wrist deeply for the first couple of days. Be careful using your hand to get into and out of a chair or bed. · If your doctor recommends it, get more exercise. Walking is a good choice. Bit by bit, increase the amount you walk every day. Try for at least 30 minutes on most days of the week. Diet  · Drink plenty of fluids to help your body flush out the dye. If you have kidney, heart, or liver disease and have to limit fluids, talk with your doctor before you increase the amount of fluids you drink.   · Keep eating a heart-healthy diet that has lots of fruits, vegetables, and whole grains. If you have not been eating this way, talk to your doctor. You also may want to talk to a dietitian. This expert can help you to learn about healthy foods and plan meals. Medicines  · Your doctor will tell you if and when you can restart your medicines. He or she will also give you instructions about taking any new medicines. · If you take blood thinners, such as warfarin (Coumadin), clopidogrel (Plavix), or aspirin, be sure to talk to your doctor. He or she will tell you if and when to start taking those medicines again. Make sure that you understand exactly what your doctor wants you to do. · Your doctor will prescribe blood-thinning medicines. You will likely take aspirin plus another antiplatelet, such as clopidogrel (Plavix). It is very important that you take these medicines exactly as directed. These medicines help keep the coronary artery open and reduce your risk of a heart attack. · Call your doctor if you think you are having a problem with your medicine. Care of the catheter site  · For 1 or 2 days, keep a bandage over the spot where the catheter was inserted. The bandage probably will fall off in this time. · Put ice or a cold pack on the area for 10 to 20 minutes at a time to help with soreness or swelling. Put a thin cloth between the ice and your skin. Follow-up care is a key part of your treatment and safety. Be sure to make and go to all appointments, and call your doctor if you are having problems. It's also a good idea to know your test results and keep a list of the medicines you take. When should you call for help? Call 911 anytime you think you may need emergency care. For example, call if:  · You passed out (lost consciousness). · You have severe trouble breathing. · You have sudden chest pain and shortness of breath, or you cough up blood. · You have symptoms of a heart attack, such as:  ¨ Chest pain or pressure. ¨ Sweating.   ¨ Shortness of breath. ¨ Nausea or vomiting. ¨ Pain that spreads from the chest to the neck, jaw, or one or both shoulders or arms. ¨ Dizziness or lightheadedness. ¨ A fast or uneven pulse. After calling 911, chew 1 adult-strength aspirin. Wait for an ambulance. Do not try to drive yourself. · You have been diagnosed with angina, and you have angina symptoms that do not go away with rest or are not getting better within 5 minutes after you take one dose of nitroglycerin. Call your doctor now or seek immediate medical care if:  · You are bleeding from the area where the catheter was put in your artery. · You have a fast-growing, painful lump at the catheter site. · You have signs of infection, such as:  ¨ Increased pain, swelling, warmth, or redness. ¨ Red streaks leading from the catheter site. ¨ Pus draining from the catheter site. ¨ A fever. · Your leg or arm looks blue or feels cold, numb, or tingly. Watch closely for changes in your health, and be sure to contact your doctor if you have any problems. Where can you learn more? Go to http://rochelle-lindsay.info/. Enter G172 in the search box to learn more about \"Percutaneous Coronary Intervention: What to Expect at Home. \"  Current as of: January 27, 2016  Content Version: 11.1  © 6961-4612 [employer]. Care instructions adapted under license by [employer] (which disclaims liability or warranty for this information). If you have questions about a medical condition or this instruction, always ask your healthcare professional. [place] [address] any warranty or liability for your use of this information. Avoiding Triggers With Heart Failure: Care Instructions  Your Care Instructions  Triggers are anything that make your heart failure flare up. A flare-up is also called \"sudden heart failure\" or \"acute heart failure. \" When you have a flare-up, fluid builds up in your lungs, and you have problems breathing. You might need to go to the hospital. By watching for changes in your condition and avoiding triggers, you can prevent heart failure flare-ups. Follow-up care is a key part of your treatment and safety. Be sure to make and go to all appointments, and call your doctor if you are having problems. It's also a good idea to know your test results and keep a list of the medicines you take. How can you care for yourself at home? Watch for changes in your weight and condition  · Weigh yourself without clothing at the same time each day. Record your weight. Call your doctor if you gain 3 pounds or more in 2 to 3 days. A sudden weight gain may mean that your heart failure is getting worse. · Keep a daily record of your symptoms. Write down any changes in how you feel, such as new shortness of breath, cough, or problems eating. Also record if your ankles are more swollen than usual and if you have to urinate in the night more often. Note anything that you ate or did that could have triggered these changes. Limit sodium  Sodium causes your body to hold on to water, making it harder for your heart to pump. People get most of their sodium from processed foods. Fast food and restaurant meals also tend to be very high in sodium. · Your doctor may suggest that you limit sodium to 2,000 milligrams (mg) a day or less. That is less than 1 teaspoon of salt a day, including all the salt you eat in cooking or in packaged foods. · Read food labels on cans and food packages. They tell you how much sodium you get in one serving. Check the serving size. If you eat more than one serving, you are getting more sodium. · Be aware that sodium can come in forms other than salt, including monosodium glutamate (MSG), sodium citrate, and sodium bicarbonate (baking soda). MSG is often added to Asian food. You can sometimes ask for food without MSG or salt. · Slowly reducing salt will help you adjust to the taste.  Take the salt shaker off the table. · Flavor your food with garlic, lemon juice, onion, vinegar, herbs, and spices instead of salt. Do not use soy sauce, steak sauce, onion salt, garlic salt, mustard, or ketchup on your food, unless it is labeled \"low-sodium\" or \"low-salt. \"  · Make your own salad dressings, sauces, and ketchup without adding salt. · Use fresh or frozen ingredients, instead of canned ones, whenever you can. Choose low-sodium canned goods. · Eat less processed food and food from restaurants, including fast food. Exercise as directed  Moderate, regular exercise is very good for your heart. It improves your blood flow and helps control your weight. But too much exercise can stress your heart and cause a heart failure flare-up. · Check with your doctor before you start an exercise program.  · Walking is an easy way to get exercise. Start out slowly. Gradually increase the length and pace of your walk. Swimming, riding a bike, and using a treadmill are also good forms of exercise. · When you exercise, watch for signs that your heart is working too hard. You are pushing yourself too hard if you cannot talk while you are exercising. If you become short of breath or dizzy or have chest pain, stop, sit down, and rest.  · Do not exercise when you do not feel well. Take medicines correctly  · Take your medicines exactly as prescribed. Call your doctor if you think you are having a problem with your medicine. · Make a list of all the medicines you take. Include those prescribed to you by other doctors and any over-the-counter medicines, vitamins, or supplements you take. Take this list with you when you go to any doctor. · Take your medicines at the same time every day. It may help you to post a list of all the medicines you take every day and what time of day you take them. · Make taking your medicine as simple as you can.  Plan times to take your medicines when you are doing other things, such as eating a meal or getting ready for bed. This will make it easier to remember to take your medicines. · Get organized. Use helpful tools, such as daily or weekly pill containers. When should you call for help? Call 911 if you have symptoms of sudden heart failure such as:  · You have severe trouble breathing. · You cough up pink, foamy mucus. · You have a new irregular or rapid heartbeat. Call your doctor now or seek immediate medical care if:  · You have new or increased shortness of breath. · You are dizzy or lightheaded, or you feel like you may faint. · You have sudden weight gain, such as 3 pounds or more in 2 to 3 days. · You have increased swelling in your legs, ankles, or feet. · You are suddenly so tired or weak that you cannot do your usual activities. Watch closely for changes in your health, and be sure to contact your doctor if you develop new symptoms. Where can you learn more? Go to http://rochelle-lindsay.info/. Enter C666 in the search box to learn more about \"Avoiding Triggers With Heart Failure: Care Instructions. \"  Current as of: April 27, 2016  Content Version: 11.1  © 4966-3275 [employer]. Care instructions adapted under license by [employer] (which disclaims liability or warranty for this information). If you have questions about a medical condition or this instruction, always ask your healthcare professional. [address] any warranty or liability for your use of this information.

## 2017-01-06 NOTE — PROGRESS NOTES
Verbal bedside report given to Heath Workman, oncoming RN. Patient's situation, background, assessment and recommendations provided. Opportunity for questions provided. Oncoming RN assumed care of patient. Right radial site visualized.   CDI and without hematoma

## 2017-01-06 NOTE — PROGRESS NOTES
Verbal bedside report received from [place] [facility], [facility]. Assumed care of patient.  Right radial site visualized at bedside

## 2017-01-06 NOTE — PROGRESS NOTES
SW dc screening  Adm with CP. S/p LHC w/ PCI. Independent with all functioning. Drives. Lives alone but g'susu will be with her after dc. Has a PCP, rx plan. Observation letter given. Signed copy placed in chart.

## 2017-01-06 NOTE — PROGRESS NOTES
Problem: Unstable angina/NSTEMI: Day of Admission/Day 1  Goal: Diagnostic Test/Procedures  Outcome: Progressing Towards Goal  Cath site clean and dry and no signs of hematoma present

## 2017-01-30 ENCOUNTER — HOSPITAL ENCOUNTER (OUTPATIENT)
Dept: LAB | Age: 82
Discharge: HOME | End: 2017-01-30
Attending: INTERNAL MEDICINE
Payer: MEDICARE

## 2017-01-30 DIAGNOSIS — D64.9 ANEMIA, UNSPECIFIED TYPE: ICD-10-CM

## 2017-01-30 LAB
BASOPHILS # BLD AUTO: 0.1 K/UL (ref 0–0.2)
BASOPHILS # BLD: 1 % (ref 0–2)
DIFFERENTIAL METHOD BLD: ABNORMAL
EOSINOPHIL # BLD: 0.3 K/UL (ref 0–0.8)
EOSINOPHIL NFR BLD: 4 % (ref 0.5–7.8)
ERYTHROCYTE [DISTWIDTH] IN BLOOD BY AUTOMATED COUNT: 14.6 % (ref 11.9–14.6)
HCT VFR BLD AUTO: 37.3 % (ref 35.8–46.3)
HGB BLD-MCNC: 12.1 G/DL (ref 11.7–15.4)
LYMPHOCYTES # BLD AUTO: 24 % (ref 13–44)
LYMPHOCYTES # BLD: 1.8 K/UL (ref 0.5–4.6)
MCH RBC QN AUTO: 28 PG (ref 26.1–32.9)
MCHC RBC AUTO-ENTMCNC: 32.4 G/DL (ref 31.4–35)
MCV RBC AUTO: 86.3 FL (ref 79.6–97.8)
MONOCYTES # BLD: 0.8 K/UL (ref 0.1–1.3)
MONOCYTES NFR BLD AUTO: 10 % (ref 4–12)
NEUTS SEG # BLD: 4.6 K/UL (ref 1.7–8.2)
NEUTS SEG NFR BLD AUTO: 61 % (ref 43–78)
PLATELET # BLD AUTO: 259 K/UL (ref 150–450)
PMV BLD AUTO: 9.6 FL (ref 10.8–14.1)
RBC # BLD AUTO: 4.32 M/UL (ref 4.05–5.25)
WBC # BLD AUTO: 7.6 K/UL (ref 4.3–11.1)

## 2017-01-30 PROCEDURE — 36415 COLL VENOUS BLD VENIPUNCTURE: CPT | Performed by: INTERNAL MEDICINE

## 2017-01-30 PROCEDURE — 85025 COMPLETE CBC W/AUTO DIFF WBC: CPT | Performed by: INTERNAL MEDICINE

## 2017-10-10 ENCOUNTER — HOME HEALTH ADMISSION (OUTPATIENT)
Dept: HOME HEALTH SERVICES | Facility: HOME HEALTH | Age: 82
End: 2017-10-10
Payer: MEDICARE

## 2017-10-14 ENCOUNTER — HOME CARE VISIT (OUTPATIENT)
Dept: SCHEDULING | Facility: HOME HEALTH | Age: 82
End: 2017-10-14
Payer: MEDICARE

## 2017-10-14 ENCOUNTER — HOME CARE VISIT (OUTPATIENT)
Dept: HOME HEALTH SERVICES | Facility: HOME HEALTH | Age: 82
End: 2017-10-14
Payer: MEDICARE

## 2017-10-14 PROCEDURE — G0299 HHS/HOSPICE OF RN EA 15 MIN: HCPCS

## 2017-10-14 PROCEDURE — 3331090002 HH PPS REVENUE DEBIT

## 2017-10-14 PROCEDURE — 3331090001 HH PPS REVENUE CREDIT

## 2017-10-14 PROCEDURE — 400013 HH SOC

## 2017-10-15 VITALS
BODY MASS INDEX: 30.12 KG/M2 | HEIGHT: 63 IN | SYSTOLIC BLOOD PRESSURE: 119 MMHG | TEMPERATURE: 96.4 F | DIASTOLIC BLOOD PRESSURE: 62 MMHG | HEART RATE: 61 BPM | WEIGHT: 170 LBS | RESPIRATION RATE: 20 BRPM | OXYGEN SATURATION: 98 %

## 2017-10-15 PROCEDURE — 3331090001 HH PPS REVENUE CREDIT

## 2017-10-15 PROCEDURE — 3331090002 HH PPS REVENUE DEBIT

## 2017-10-16 ENCOUNTER — HOME CARE VISIT (OUTPATIENT)
Dept: SCHEDULING | Facility: HOME HEALTH | Age: 82
End: 2017-10-16
Payer: MEDICARE

## 2017-10-16 PROCEDURE — G0151 HHCP-SERV OF PT,EA 15 MIN: HCPCS

## 2017-10-16 PROCEDURE — 3331090001 HH PPS REVENUE CREDIT

## 2017-10-16 PROCEDURE — 3331090002 HH PPS REVENUE DEBIT

## 2017-10-17 ENCOUNTER — HOME CARE VISIT (OUTPATIENT)
Dept: HOME HEALTH SERVICES | Facility: HOME HEALTH | Age: 82
End: 2017-10-17
Payer: MEDICARE

## 2017-10-17 ENCOUNTER — HOME CARE VISIT (OUTPATIENT)
Dept: SCHEDULING | Facility: HOME HEALTH | Age: 82
End: 2017-10-17
Payer: MEDICARE

## 2017-10-17 VITALS
TEMPERATURE: 97.8 F | SYSTOLIC BLOOD PRESSURE: 116 MMHG | DIASTOLIC BLOOD PRESSURE: 60 MMHG | OXYGEN SATURATION: 98 % | RESPIRATION RATE: 17 BRPM | HEART RATE: 61 BPM

## 2017-10-17 VITALS
RESPIRATION RATE: 20 BRPM | HEART RATE: 68 BPM | SYSTOLIC BLOOD PRESSURE: 116 MMHG | TEMPERATURE: 96.1 F | DIASTOLIC BLOOD PRESSURE: 60 MMHG

## 2017-10-17 PROCEDURE — 3331090002 HH PPS REVENUE DEBIT

## 2017-10-17 PROCEDURE — 3331090001 HH PPS REVENUE CREDIT

## 2017-10-17 PROCEDURE — G0299 HHS/HOSPICE OF RN EA 15 MIN: HCPCS

## 2017-10-18 PROCEDURE — 3331090001 HH PPS REVENUE CREDIT

## 2017-10-18 PROCEDURE — 3331090002 HH PPS REVENUE DEBIT

## 2017-10-19 ENCOUNTER — HOME CARE VISIT (OUTPATIENT)
Dept: SCHEDULING | Facility: HOME HEALTH | Age: 82
End: 2017-10-19
Payer: MEDICARE

## 2017-10-19 VITALS — TEMPERATURE: 97.8 F | SYSTOLIC BLOOD PRESSURE: 118 MMHG | DIASTOLIC BLOOD PRESSURE: 55 MMHG | HEART RATE: 62 BPM

## 2017-10-19 VITALS
TEMPERATURE: 97.5 F | HEART RATE: 70 BPM | RESPIRATION RATE: 17 BRPM | DIASTOLIC BLOOD PRESSURE: 60 MMHG | SYSTOLIC BLOOD PRESSURE: 120 MMHG

## 2017-10-19 PROCEDURE — 3331090001 HH PPS REVENUE CREDIT

## 2017-10-19 PROCEDURE — G0157 HHC PT ASSISTANT EA 15: HCPCS

## 2017-10-19 PROCEDURE — G0152 HHCP-SERV OF OT,EA 15 MIN: HCPCS

## 2017-10-19 PROCEDURE — 3331090002 HH PPS REVENUE DEBIT

## 2017-10-20 ENCOUNTER — HOME CARE VISIT (OUTPATIENT)
Dept: HOME HEALTH SERVICES | Facility: HOME HEALTH | Age: 82
End: 2017-10-20
Payer: MEDICARE

## 2017-10-20 ENCOUNTER — HOME CARE VISIT (OUTPATIENT)
Dept: SCHEDULING | Facility: HOME HEALTH | Age: 82
End: 2017-10-20
Payer: MEDICARE

## 2017-10-20 PROCEDURE — G0299 HHS/HOSPICE OF RN EA 15 MIN: HCPCS

## 2017-10-20 PROCEDURE — 3331090002 HH PPS REVENUE DEBIT

## 2017-10-20 PROCEDURE — 3331090001 HH PPS REVENUE CREDIT

## 2017-10-21 PROCEDURE — 3331090001 HH PPS REVENUE CREDIT

## 2017-10-21 PROCEDURE — 3331090002 HH PPS REVENUE DEBIT

## 2017-10-22 PROCEDURE — 3331090001 HH PPS REVENUE CREDIT

## 2017-10-22 PROCEDURE — 3331090002 HH PPS REVENUE DEBIT

## 2017-10-23 ENCOUNTER — HOME CARE VISIT (OUTPATIENT)
Dept: SCHEDULING | Facility: HOME HEALTH | Age: 82
End: 2017-10-23
Payer: MEDICARE

## 2017-10-23 VITALS
DIASTOLIC BLOOD PRESSURE: 61 MMHG | SYSTOLIC BLOOD PRESSURE: 111 MMHG | OXYGEN SATURATION: 99 % | HEART RATE: 64 BPM | TEMPERATURE: 97.1 F | RESPIRATION RATE: 20 BRPM

## 2017-10-23 PROCEDURE — G0299 HHS/HOSPICE OF RN EA 15 MIN: HCPCS

## 2017-10-23 PROCEDURE — 3331090002 HH PPS REVENUE DEBIT

## 2017-10-23 PROCEDURE — 3331090001 HH PPS REVENUE CREDIT

## 2017-10-24 ENCOUNTER — HOME CARE VISIT (OUTPATIENT)
Dept: SCHEDULING | Facility: HOME HEALTH | Age: 82
End: 2017-10-24
Payer: MEDICARE

## 2017-10-24 VITALS
SYSTOLIC BLOOD PRESSURE: 112 MMHG | DIASTOLIC BLOOD PRESSURE: 60 MMHG | RESPIRATION RATE: 17 BRPM | TEMPERATURE: 97 F | HEART RATE: 62 BPM

## 2017-10-24 PROCEDURE — 3331090002 HH PPS REVENUE DEBIT

## 2017-10-24 PROCEDURE — G0157 HHC PT ASSISTANT EA 15: HCPCS

## 2017-10-24 PROCEDURE — 3331090001 HH PPS REVENUE CREDIT

## 2017-10-25 ENCOUNTER — HOME CARE VISIT (OUTPATIENT)
Dept: SCHEDULING | Facility: HOME HEALTH | Age: 82
End: 2017-10-25
Payer: MEDICARE

## 2017-10-25 VITALS
TEMPERATURE: 97.6 F | DIASTOLIC BLOOD PRESSURE: 72 MMHG | SYSTOLIC BLOOD PRESSURE: 124 MMHG | HEART RATE: 72 BPM | RESPIRATION RATE: 17 BRPM | OXYGEN SATURATION: 98 %

## 2017-10-25 VITALS
SYSTOLIC BLOOD PRESSURE: 110 MMHG | DIASTOLIC BLOOD PRESSURE: 68 MMHG | HEART RATE: 61 BPM | RESPIRATION RATE: 18 BRPM | TEMPERATURE: 95.9 F | OXYGEN SATURATION: 92 %

## 2017-10-25 PROCEDURE — 3331090001 HH PPS REVENUE CREDIT

## 2017-10-25 PROCEDURE — G0158 HHC OT ASSISTANT EA 15: HCPCS

## 2017-10-25 PROCEDURE — 3331090002 HH PPS REVENUE DEBIT

## 2017-10-26 ENCOUNTER — HOME CARE VISIT (OUTPATIENT)
Dept: SCHEDULING | Facility: HOME HEALTH | Age: 82
End: 2017-10-26
Payer: MEDICARE

## 2017-10-26 ENCOUNTER — HOME CARE VISIT (OUTPATIENT)
Dept: HOME HEALTH SERVICES | Facility: HOME HEALTH | Age: 82
End: 2017-10-26
Payer: MEDICARE

## 2017-10-26 VITALS
DIASTOLIC BLOOD PRESSURE: 78 MMHG | TEMPERATURE: 96.7 F | RESPIRATION RATE: 17 BRPM | SYSTOLIC BLOOD PRESSURE: 120 MMHG | HEART RATE: 60 BPM

## 2017-10-26 VITALS
SYSTOLIC BLOOD PRESSURE: 120 MMHG | TEMPERATURE: 97.8 F | HEART RATE: 60 BPM | DIASTOLIC BLOOD PRESSURE: 78 MMHG | RESPIRATION RATE: 17 BRPM | OXYGEN SATURATION: 98 %

## 2017-10-26 PROCEDURE — 3331090002 HH PPS REVENUE DEBIT

## 2017-10-26 PROCEDURE — G0299 HHS/HOSPICE OF RN EA 15 MIN: HCPCS

## 2017-10-26 PROCEDURE — G0157 HHC PT ASSISTANT EA 15: HCPCS

## 2017-10-26 PROCEDURE — 3331090001 HH PPS REVENUE CREDIT

## 2017-10-27 ENCOUNTER — HOME CARE VISIT (OUTPATIENT)
Dept: HOME HEALTH SERVICES | Facility: HOME HEALTH | Age: 82
End: 2017-10-27
Payer: MEDICARE

## 2017-10-27 ENCOUNTER — HOME CARE VISIT (OUTPATIENT)
Dept: SCHEDULING | Facility: HOME HEALTH | Age: 82
End: 2017-10-27
Payer: MEDICARE

## 2017-10-27 VITALS
TEMPERATURE: 98.2 F | OXYGEN SATURATION: 96 % | RESPIRATION RATE: 17 BRPM | DIASTOLIC BLOOD PRESSURE: 61 MMHG | SYSTOLIC BLOOD PRESSURE: 135 MMHG | HEART RATE: 61 BPM

## 2017-10-27 PROCEDURE — 3331090002 HH PPS REVENUE DEBIT

## 2017-10-27 PROCEDURE — G0158 HHC OT ASSISTANT EA 15: HCPCS

## 2017-10-27 PROCEDURE — 3331090001 HH PPS REVENUE CREDIT

## 2017-10-28 PROCEDURE — 3331090002 HH PPS REVENUE DEBIT

## 2017-10-28 PROCEDURE — 3331090001 HH PPS REVENUE CREDIT

## 2017-10-29 PROCEDURE — 3331090002 HH PPS REVENUE DEBIT

## 2017-10-29 PROCEDURE — 3331090001 HH PPS REVENUE CREDIT

## 2017-10-30 PROCEDURE — 3331090001 HH PPS REVENUE CREDIT

## 2017-10-30 PROCEDURE — 3331090002 HH PPS REVENUE DEBIT

## 2017-10-31 ENCOUNTER — HOME CARE VISIT (OUTPATIENT)
Dept: SCHEDULING | Facility: HOME HEALTH | Age: 82
End: 2017-10-31
Payer: MEDICARE

## 2017-10-31 VITALS
HEART RATE: 60 BPM | RESPIRATION RATE: 17 BRPM | DIASTOLIC BLOOD PRESSURE: 66 MMHG | TEMPERATURE: 97.6 F | SYSTOLIC BLOOD PRESSURE: 139 MMHG

## 2017-10-31 VITALS
SYSTOLIC BLOOD PRESSURE: 139 MMHG | OXYGEN SATURATION: 94 % | RESPIRATION RATE: 18 BRPM | TEMPERATURE: 97.6 F | DIASTOLIC BLOOD PRESSURE: 66 MMHG | HEART RATE: 60 BPM

## 2017-10-31 PROCEDURE — 3331090001 HH PPS REVENUE CREDIT

## 2017-10-31 PROCEDURE — G0158 HHC OT ASSISTANT EA 15: HCPCS

## 2017-10-31 PROCEDURE — 3331090002 HH PPS REVENUE DEBIT

## 2017-10-31 PROCEDURE — G0157 HHC PT ASSISTANT EA 15: HCPCS

## 2017-11-01 ENCOUNTER — HOME CARE VISIT (OUTPATIENT)
Dept: SCHEDULING | Facility: HOME HEALTH | Age: 82
End: 2017-11-01
Payer: MEDICARE

## 2017-11-01 PROCEDURE — G0299 HHS/HOSPICE OF RN EA 15 MIN: HCPCS

## 2017-11-01 PROCEDURE — 3331090002 HH PPS REVENUE DEBIT

## 2017-11-01 PROCEDURE — 3331090001 HH PPS REVENUE CREDIT

## 2017-11-02 ENCOUNTER — HOME CARE VISIT (OUTPATIENT)
Dept: SCHEDULING | Facility: HOME HEALTH | Age: 82
End: 2017-11-02
Payer: MEDICARE

## 2017-11-02 VITALS
SYSTOLIC BLOOD PRESSURE: 110 MMHG | HEART RATE: 64 BPM | DIASTOLIC BLOOD PRESSURE: 60 MMHG | RESPIRATION RATE: 17 BRPM | TEMPERATURE: 97 F

## 2017-11-02 VITALS
DIASTOLIC BLOOD PRESSURE: 58 MMHG | OXYGEN SATURATION: 98 % | TEMPERATURE: 97.8 F | RESPIRATION RATE: 17 BRPM | HEART RATE: 70 BPM | SYSTOLIC BLOOD PRESSURE: 112 MMHG

## 2017-11-02 PROCEDURE — 3331090001 HH PPS REVENUE CREDIT

## 2017-11-02 PROCEDURE — G0157 HHC PT ASSISTANT EA 15: HCPCS

## 2017-11-02 PROCEDURE — 3331090002 HH PPS REVENUE DEBIT

## 2017-11-03 ENCOUNTER — HOME CARE VISIT (OUTPATIENT)
Dept: SCHEDULING | Facility: HOME HEALTH | Age: 82
End: 2017-11-03
Payer: MEDICARE

## 2017-11-03 VITALS
TEMPERATURE: 97.3 F | SYSTOLIC BLOOD PRESSURE: 122 MMHG | RESPIRATION RATE: 16 BRPM | OXYGEN SATURATION: 95 % | HEART RATE: 61 BPM | DIASTOLIC BLOOD PRESSURE: 64 MMHG

## 2017-11-03 PROCEDURE — E0700 SAFETY EQUIPMENT: HCPCS

## 2017-11-03 PROCEDURE — G0158 HHC OT ASSISTANT EA 15: HCPCS

## 2017-11-03 PROCEDURE — 3331090001 HH PPS REVENUE CREDIT

## 2017-11-03 PROCEDURE — 3331090002 HH PPS REVENUE DEBIT

## 2017-11-04 PROCEDURE — 3331090002 HH PPS REVENUE DEBIT

## 2017-11-04 PROCEDURE — 3331090001 HH PPS REVENUE CREDIT

## 2017-11-05 PROCEDURE — 3331090001 HH PPS REVENUE CREDIT

## 2017-11-05 PROCEDURE — 3331090002 HH PPS REVENUE DEBIT

## 2017-11-06 ENCOUNTER — HOME CARE VISIT (OUTPATIENT)
Dept: SCHEDULING | Facility: HOME HEALTH | Age: 82
End: 2017-11-06
Payer: MEDICARE

## 2017-11-06 VITALS
RESPIRATION RATE: 17 BRPM | DIASTOLIC BLOOD PRESSURE: 60 MMHG | SYSTOLIC BLOOD PRESSURE: 102 MMHG | OXYGEN SATURATION: 98 % | HEART RATE: 80 BPM | TEMPERATURE: 97.8 F

## 2017-11-06 PROCEDURE — 3331090001 HH PPS REVENUE CREDIT

## 2017-11-06 PROCEDURE — G0299 HHS/HOSPICE OF RN EA 15 MIN: HCPCS

## 2017-11-06 PROCEDURE — 3331090002 HH PPS REVENUE DEBIT

## 2017-11-07 ENCOUNTER — HOME CARE VISIT (OUTPATIENT)
Dept: SCHEDULING | Facility: HOME HEALTH | Age: 82
End: 2017-11-07
Payer: MEDICARE

## 2017-11-07 VITALS
RESPIRATION RATE: 18 BRPM | TEMPERATURE: 97.8 F | SYSTOLIC BLOOD PRESSURE: 114 MMHG | DIASTOLIC BLOOD PRESSURE: 62 MMHG | OXYGEN SATURATION: 96 % | HEART RATE: 61 BPM

## 2017-11-07 VITALS
DIASTOLIC BLOOD PRESSURE: 62 MMHG | SYSTOLIC BLOOD PRESSURE: 114 MMHG | HEART RATE: 61 BPM | RESPIRATION RATE: 18 BRPM | TEMPERATURE: 97.8 F

## 2017-11-07 PROCEDURE — 3331090002 HH PPS REVENUE DEBIT

## 2017-11-07 PROCEDURE — G0158 HHC OT ASSISTANT EA 15: HCPCS

## 2017-11-07 PROCEDURE — G0157 HHC PT ASSISTANT EA 15: HCPCS

## 2017-11-07 PROCEDURE — 3331090001 HH PPS REVENUE CREDIT

## 2017-11-08 ENCOUNTER — HOME CARE VISIT (OUTPATIENT)
Dept: SCHEDULING | Facility: HOME HEALTH | Age: 82
End: 2017-11-08
Payer: MEDICARE

## 2017-11-08 VITALS — DIASTOLIC BLOOD PRESSURE: 64 MMHG | SYSTOLIC BLOOD PRESSURE: 120 MMHG | HEART RATE: 61 BPM | TEMPERATURE: 97.3 F

## 2017-11-08 PROCEDURE — 3331090002 HH PPS REVENUE DEBIT

## 2017-11-08 PROCEDURE — G0152 HHCP-SERV OF OT,EA 15 MIN: HCPCS

## 2017-11-08 PROCEDURE — 3331090001 HH PPS REVENUE CREDIT

## 2017-11-09 ENCOUNTER — HOME CARE VISIT (OUTPATIENT)
Dept: SCHEDULING | Facility: HOME HEALTH | Age: 82
End: 2017-11-09
Payer: MEDICARE

## 2017-11-09 VITALS — SYSTOLIC BLOOD PRESSURE: 100 MMHG | RESPIRATION RATE: 17 BRPM | HEART RATE: 68 BPM | DIASTOLIC BLOOD PRESSURE: 64 MMHG

## 2017-11-09 PROCEDURE — 3331090001 HH PPS REVENUE CREDIT

## 2017-11-09 PROCEDURE — 3331090002 HH PPS REVENUE DEBIT

## 2017-11-09 PROCEDURE — G0157 HHC PT ASSISTANT EA 15: HCPCS

## 2017-11-10 PROCEDURE — 3331090002 HH PPS REVENUE DEBIT

## 2017-11-10 PROCEDURE — 3331090001 HH PPS REVENUE CREDIT

## 2017-11-11 PROCEDURE — 3331090001 HH PPS REVENUE CREDIT

## 2017-11-11 PROCEDURE — 3331090002 HH PPS REVENUE DEBIT

## 2017-11-12 PROCEDURE — 3331090001 HH PPS REVENUE CREDIT

## 2017-11-12 PROCEDURE — 3331090002 HH PPS REVENUE DEBIT

## 2017-11-13 PROCEDURE — 3331090001 HH PPS REVENUE CREDIT

## 2017-11-13 PROCEDURE — 3331090002 HH PPS REVENUE DEBIT

## 2017-11-14 PROCEDURE — 3331090001 HH PPS REVENUE CREDIT

## 2017-11-14 PROCEDURE — 3331090002 HH PPS REVENUE DEBIT

## 2017-11-15 ENCOUNTER — HOME CARE VISIT (OUTPATIENT)
Dept: SCHEDULING | Facility: HOME HEALTH | Age: 82
End: 2017-11-15
Payer: MEDICARE

## 2017-11-15 VITALS
HEART RATE: 64 BPM | DIASTOLIC BLOOD PRESSURE: 60 MMHG | TEMPERATURE: 96.3 F | SYSTOLIC BLOOD PRESSURE: 122 MMHG | RESPIRATION RATE: 19 BRPM

## 2017-11-15 PROCEDURE — G0151 HHCP-SERV OF PT,EA 15 MIN: HCPCS

## 2017-11-15 PROCEDURE — 3331090002 HH PPS REVENUE DEBIT

## 2017-11-15 PROCEDURE — 3331090001 HH PPS REVENUE CREDIT

## 2017-11-16 ENCOUNTER — HOME CARE VISIT (OUTPATIENT)
Dept: SCHEDULING | Facility: HOME HEALTH | Age: 82
End: 2017-11-16
Payer: MEDICARE

## 2017-11-16 VITALS — HEART RATE: 68 BPM | SYSTOLIC BLOOD PRESSURE: 110 MMHG | DIASTOLIC BLOOD PRESSURE: 64 MMHG | RESPIRATION RATE: 17 BRPM

## 2017-11-16 PROCEDURE — 3331090001 HH PPS REVENUE CREDIT

## 2017-11-16 PROCEDURE — G0157 HHC PT ASSISTANT EA 15: HCPCS

## 2017-11-16 PROCEDURE — 3331090002 HH PPS REVENUE DEBIT

## 2017-11-17 ENCOUNTER — HOME CARE VISIT (OUTPATIENT)
Dept: HOME HEALTH SERVICES | Facility: HOME HEALTH | Age: 82
End: 2017-11-17
Payer: MEDICARE

## 2017-11-17 PROCEDURE — 3331090001 HH PPS REVENUE CREDIT

## 2017-11-17 PROCEDURE — 3331090002 HH PPS REVENUE DEBIT

## 2017-11-18 PROCEDURE — 3331090002 HH PPS REVENUE DEBIT

## 2017-11-18 PROCEDURE — 3331090001 HH PPS REVENUE CREDIT

## 2017-11-19 PROCEDURE — 3331090001 HH PPS REVENUE CREDIT

## 2017-11-19 PROCEDURE — 3331090002 HH PPS REVENUE DEBIT

## 2017-11-20 PROCEDURE — 3331090002 HH PPS REVENUE DEBIT

## 2017-11-20 PROCEDURE — 3331090001 HH PPS REVENUE CREDIT

## 2017-11-21 ENCOUNTER — HOME CARE VISIT (OUTPATIENT)
Dept: SCHEDULING | Facility: HOME HEALTH | Age: 82
End: 2017-11-21
Payer: MEDICARE

## 2017-11-21 VITALS
HEART RATE: 68 BPM | SYSTOLIC BLOOD PRESSURE: 120 MMHG | TEMPERATURE: 97.1 F | DIASTOLIC BLOOD PRESSURE: 62 MMHG | RESPIRATION RATE: 18 BRPM

## 2017-11-21 PROCEDURE — 3331090002 HH PPS REVENUE DEBIT

## 2017-11-21 PROCEDURE — 3331090001 HH PPS REVENUE CREDIT

## 2017-11-21 PROCEDURE — G0157 HHC PT ASSISTANT EA 15: HCPCS

## 2017-11-22 PROCEDURE — 3331090001 HH PPS REVENUE CREDIT

## 2017-11-22 PROCEDURE — 3331090002 HH PPS REVENUE DEBIT

## 2017-11-23 PROCEDURE — 3331090002 HH PPS REVENUE DEBIT

## 2017-11-23 PROCEDURE — 3331090001 HH PPS REVENUE CREDIT

## 2017-11-24 ENCOUNTER — HOME CARE VISIT (OUTPATIENT)
Dept: SCHEDULING | Facility: HOME HEALTH | Age: 82
End: 2017-11-24
Payer: MEDICARE

## 2017-11-24 VITALS
DIASTOLIC BLOOD PRESSURE: 60 MMHG | SYSTOLIC BLOOD PRESSURE: 128 MMHG | TEMPERATURE: 97.6 F | HEART RATE: 58 BPM | RESPIRATION RATE: 18 BRPM

## 2017-11-24 PROCEDURE — G0151 HHCP-SERV OF PT,EA 15 MIN: HCPCS

## 2017-11-24 PROCEDURE — 3331090002 HH PPS REVENUE DEBIT

## 2017-11-24 PROCEDURE — 3331090003 HH PPS REVENUE ADJ

## 2017-11-24 PROCEDURE — 3331090001 HH PPS REVENUE CREDIT

## 2017-11-25 PROCEDURE — 3331090001 HH PPS REVENUE CREDIT

## 2017-11-25 PROCEDURE — 3331090002 HH PPS REVENUE DEBIT

## 2017-11-26 PROCEDURE — 3331090001 HH PPS REVENUE CREDIT

## 2017-11-26 PROCEDURE — 3331090002 HH PPS REVENUE DEBIT

## 2017-11-27 PROCEDURE — 3331090001 HH PPS REVENUE CREDIT

## 2017-11-27 PROCEDURE — 3331090002 HH PPS REVENUE DEBIT

## 2017-11-28 PROCEDURE — 3331090002 HH PPS REVENUE DEBIT

## 2017-11-28 PROCEDURE — 3331090001 HH PPS REVENUE CREDIT

## 2017-11-29 PROCEDURE — 3331090002 HH PPS REVENUE DEBIT

## 2017-11-29 PROCEDURE — 3331090001 HH PPS REVENUE CREDIT

## 2017-11-30 PROCEDURE — 3331090001 HH PPS REVENUE CREDIT

## 2017-11-30 PROCEDURE — 3331090002 HH PPS REVENUE DEBIT

## 2017-12-01 PROCEDURE — 3331090002 HH PPS REVENUE DEBIT

## 2017-12-01 PROCEDURE — 3331090001 HH PPS REVENUE CREDIT

## 2017-12-02 PROCEDURE — 3331090002 HH PPS REVENUE DEBIT

## 2017-12-02 PROCEDURE — 3331090001 HH PPS REVENUE CREDIT

## 2017-12-03 PROCEDURE — 3331090001 HH PPS REVENUE CREDIT

## 2017-12-03 PROCEDURE — 3331090002 HH PPS REVENUE DEBIT

## 2017-12-04 PROCEDURE — 3331090001 HH PPS REVENUE CREDIT

## 2017-12-04 PROCEDURE — 3331090002 HH PPS REVENUE DEBIT

## 2021-06-07 ENCOUNTER — HOSPITAL ENCOUNTER (OUTPATIENT)
Dept: ULTRASOUND IMAGING | Age: 86
Discharge: HOME | End: 2021-06-07
Attending: INTERNAL MEDICINE

## 2021-06-07 DIAGNOSIS — R60.0 LOCALIZED EDEMA: ICD-10-CM

## 2021-07-07 PROBLEM — R60.0 LOCALIZED EDEMA: Status: ACTIVE | Noted: 2021-07-07

## 2022-03-19 PROBLEM — R60.0 LOCALIZED EDEMA: Status: ACTIVE | Noted: 2021-07-07

## 2022-03-19 PROBLEM — I25.118 CORONARY ARTERY DISEASE OF NATIVE ARTERY OF NATIVE HEART WITH STABLE ANGINA PECTORIS (HCC): Status: ACTIVE | Noted: 2017-01-05

## 2022-09-01 RX ORDER — FUROSEMIDE 40 MG/1
TABLET ORAL
Qty: 90 TABLET | Refills: 1 | Status: SHIPPED | OUTPATIENT
Start: 2022-09-01

## 2022-10-06 NOTE — TELEPHONE ENCOUNTER
Requested Prescriptions     Pending Prescriptions Disp Refills    [id number] Cmp-C-Biot (FOLIVANE-PLUS) CAPS [Pharmacy Med Name: Juice Bloom 90 capsule 2     Sig: TAKE 1 CAPSULE BY MOUTH DAILY

## 2022-10-10 RX ORDER — IRON FUM,PS/FOLIC/BCOMP,C NO.9 125 MG-1MG
CAPSULE ORAL
Qty: 90 CAPSULE | Refills: 2 | Status: SHIPPED | OUTPATIENT
Start: 2022-10-10

## 2022-11-09 ENCOUNTER — OFFICE VISIT (OUTPATIENT)
Dept: CARDIOLOGY CLINIC | Age: 87
End: 2022-11-09
Payer: MEDICARE

## 2022-11-09 VITALS
HEART RATE: 57 BPM | DIASTOLIC BLOOD PRESSURE: 60 MMHG | WEIGHT: 196 LBS | BODY MASS INDEX: 34.72 KG/M2 | SYSTOLIC BLOOD PRESSURE: 124 MMHG

## 2022-11-09 DIAGNOSIS — I25.118 CORONARY ARTERY DISEASE OF NATIVE ARTERY OF NATIVE HEART WITH STABLE ANGINA PECTORIS (HCC): ICD-10-CM

## 2022-11-09 DIAGNOSIS — E78.5 DYSLIPIDEMIA: ICD-10-CM

## 2022-11-09 DIAGNOSIS — I27.20 PULMONARY HYPERTENSION (HCC): ICD-10-CM

## 2022-11-09 DIAGNOSIS — I50.22 CHRONIC SYSTOLIC HEART FAILURE (HCC): ICD-10-CM

## 2022-11-09 DIAGNOSIS — I25.118 ATHEROSCLEROTIC HEART DISEASE OF NATIVE CORONARY ARTERY WITH OTHER FORMS OF ANGINA PECTORIS (HCC): Primary | ICD-10-CM

## 2022-11-09 PROCEDURE — 1123F ACP DISCUSS/DSCN MKR DOCD: CPT | Performed by: INTERNAL MEDICINE

## 2022-11-09 PROCEDURE — 93000 ELECTROCARDIOGRAM COMPLETE: CPT | Performed by: INTERNAL MEDICINE

## 2022-11-09 PROCEDURE — 99214 OFFICE O/P EST MOD 30 MIN: CPT | Performed by: INTERNAL MEDICINE

## 2022-11-09 RX ORDER — CARVEDILOL 6.25 MG/1
6.25 TABLET ORAL 2 TIMES DAILY WITH MEALS
COMMUNITY
End: 2022-11-09 | Stop reason: SDUPTHER

## 2022-11-09 RX ORDER — CARVEDILOL 6.25 MG/1
6.25 TABLET ORAL 2 TIMES DAILY WITH MEALS
Qty: 60 TABLET | Refills: 11 | Status: SHIPPED | OUTPATIENT
Start: 2022-11-09

## 2022-11-09 NOTE — PROGRESS NOTES
[address] [address] [place], [address]  PHONE: [phone]      22    NAME:  Duane Rehman  : 1933  MRN: [id number]       SUBJECTIVE:   Duane Rehman is a 80 y.o. female seen for a follow up visit regarding the following:     Chief Complaint   Patient presents with    Congestive Heart Failure         HPI:    No cp. No orthopnea or pnd. No palpitations or syncope. Ech[place] is worse. Past Medical History, Past Surgical History, Family history, Social History, and Medications were all reviewed with the patient today and updated as necessary.      Current Outpatient Medications   Medication Sig Dispense Refill    carvedilol (COREG) 6.25 MG tablet Take 6.25 mg by mouth 2 times daily (with meals)      [id number] Cmp-C-Biot (FOLIVANE-PLUS) CAPS TAKE 1 CAPSULE BY MOUTH DAILY 90 capsule 2    furosemide (LASIX) 40 MG tablet TAKE 1 TABLET BY MOUTH EVERY DAY (Patient taking differently: 40-80 mg TAKE 1 TABLET BY MOUTH EVERY DAY) 90 tablet 1    acetaminophen (TYLENOL) 500 MG tablet Take 500 mg by mouth every 4 hours as needed      ascorbic acid (VITAMIN C) 500 MG tablet Take by mouth      aspirin 81 MG chewable tablet Take 81 mg by mouth daily      atorvastatin (LIPITOR) 40 MG tablet Take 40 mg by mouth daily      baclofen (LIORESAL) 10 MG tablet Take 10 mg by mouth 2 times daily      vitamin D 25 MCG (1000 UT) CAPS Take by mouth daily      cyanocobalamin 1000 MCG tablet Take 1,000 mcg by mouth daily      diclofenac sodium (VOLTAREN) 1 % GEL Apply 1-2 g topically 3 times daily as needed      escitalopram (LEXAPRO) 10 MG tablet Take 10 mg by mouth daily      insulin glargine (LANTUS SOLOSTAR) 100 UNIT/ML injection pen INJECT 20 UNITS UNDER THE SKIN DAILY      insulin lispro, 1 Unit Dial, 100 UNIT/ML SOPN Inject 10 Units into the skin After each meal,extra 2 units at night prn      linagliptin (TRADJENTA) 5 MG tablet Take 5 mg by mouth daily      melatonin 3 MG TABS tablet Take by mouth      nitroGLYCERIN (NITROSTAT) 0.4 MG SL tablet Place 0.4 mg under the tongue      traZODone (DESYREL) 50 MG tablet TAKE 1 TABLET BY MOUTH EVERY DAY AT BEDTIME AS NEEDED FOR SLEEP       No current facility-administered medications for this visit. Social History     Tobacco Use    Smoking status: Former     Packs/day: 0.25     Types: Cigarettes     Quit date: 10/11/2011     Years since quittin.0    Smokeless tobacco: Never   Substance Use Topics    Alcohol use: No              PHYSICAL EXAM:   /60   Pulse 57   Wt 196 lb (88.9 kg)   BMI 34.72 kg/m²    Constitutional: Oriented to person, place, and time. Appears well-developed and well-nourished. Head: Normocephalic and atraumatic. Neck: Neck supple. Cardiovascular: Normal rate and regular rhythm with no murmur -No JVP  Pulmonary/Chest: Breath sounds normal.   Abdominal: Soft. Musculoskeletal: No edema. Neurological: Alert and oriented to person, place, and time. Skin: Skin is warm and dry. Psychiatric: Normal mood and affect. Vitals reviewed. Wt Readings from Last 3 Encounters:   22 196 lb (88.9 kg)   22 198 lb (89.8 kg)   10/11/21 199 lb (90.3 kg)   Ekg-Sinus  Bradycardia  - occasional ectopic ventricular beat     Nonspecific T-abnormality. hr 62    Medical problems and test results were reviewed with the patient today. ASSESSMENT and PLAN    1. Atherosclerotic heart disease of native coronary artery with other forms of angina pectoris ([address]  Stable. Continue asa- no invasive produres with age and significant cva hx    - EKG 12 Lead    2. Chronic systolic heart failure (HCC)  Stable. Continue coreg and lasix    - Transthoracic echocardiogram (TTE) complete with contrast, bubble, strain, and 3D PRN; Future    3. Pulmonary hypertension (HCC)  Stable- echo    4. Dyslipidemia  Stable. Continue lipitor         Return in about 6 months (around 2023).          [facility]

## 2022-11-30 ENCOUNTER — TELEPHONE (OUTPATIENT)
Dept: CARDIOLOGY CLINIC | Age: 87
End: 2022-11-30

## 2023-01-23 ENCOUNTER — TELEPHONE (OUTPATIENT)
Dept: CARDIOLOGY CLINIC | Age: 88
End: 2023-01-23

## 2023-01-23 RX ORDER — FUROSEMIDE 40 MG/1
40-80 TABLET ORAL DAILY
Qty: 180 TABLET | Refills: 2 | Status: SHIPPED | OUTPATIENT
Start: 2023-01-23

## 2023-01-23 NOTE — TELEPHONE ENCOUNTER
Patients daughter called stating she needs a Rx written for :    furosemide (LASIX) 40 MG tablet  # 90 day supply      [facility] [place] [address] [facility]      P  [phone]

## 2023-01-23 NOTE — TELEPHONE ENCOUNTER
Requested Prescriptions     Pending Prescriptions Disp Refills    furosemide (LASIX) 40 MG tablet [Pharmacy Med Name: FUROSEMIDE 40 MG TABLET] 180 tablet 2     Sig: Take 1-2 tablets by mouth daily TAKE 1 TABLET BY MOUTH EVERY DAY

## 2023-04-05 ENCOUNTER — TELEPHONE (OUTPATIENT)
Dept: CARDIOLOGY CLINIC | Age: 88
End: 2023-04-05

## 2023-04-05 NOTE — TELEPHONE ENCOUNTER
Artem Morales MD  Render Angel RN  Caller: Unspecified (Today,  9:03 AM)  TAKE LASIX 40 MG BID AND MAKE APPT NEXT WEEK

## 2023-04-05 NOTE — TELEPHONE ENCOUNTER
Left message on voicemail for Tea to call this triage nurse.  Per Harry Rice at [facility], scheduled MA appointment with Dr. Sony Solitario on Monday, 4/10/23 at 4:00 pm.

## 2023-04-05 NOTE — TELEPHONE ENCOUNTER
Increased SOB with transfers from wheelchair to bed x 1 week. Frequent wheezing. Sleeps propped up on 2 pillows. Gained 5 lbs in past few weeks. No edema or chest pain. Sleeping much more than usual. Fell asleep during bath and when holding coffee cup. \"Not herself\". Much more \"grouchy\" than usual.   Taking lasix 40 mg 1 tab QOD alternating with 2 tabs QOD, carvedilol 6.25 mg BID, ASA 81 mg qd, and Lipitor 40 mg qd. Next scheduled appointment with Dr. Yojana Glass  is 5/16/23. Barry Cullen, asks for Dr. Yisel Sorenson recommendations for SOB and weight gain,  and asks if patient should be seen sooner than 5/16/23.

## 2023-04-05 NOTE — TELEPHONE ENCOUNTER
Gemini Sequeira of Dr. Lawyer Guerrero response and Monday, 4/10/23 4:00 pm MA appointment with Dr. Juan Lugo. Tea verbalized understanding.

## 2023-04-05 NOTE — TELEPHONE ENCOUNTER
Talya Share called and said they are concerned that she has fluid build up and they want her to be seen sooner Please call   Her caregiver

## 2023-05-16 ENCOUNTER — OFFICE VISIT (OUTPATIENT)
Age: 88
End: 2023-05-16
Payer: MEDICARE

## 2023-05-16 VITALS
HEIGHT: 63 IN | SYSTOLIC BLOOD PRESSURE: 122 MMHG | BODY MASS INDEX: 35.79 KG/M2 | WEIGHT: 202 LBS | DIASTOLIC BLOOD PRESSURE: 62 MMHG | HEART RATE: 60 BPM

## 2023-05-16 DIAGNOSIS — E78.5 DYSLIPIDEMIA: ICD-10-CM

## 2023-05-16 DIAGNOSIS — I50.22 CHRONIC SYSTOLIC HEART FAILURE (HCC): Primary | ICD-10-CM

## 2023-05-16 DIAGNOSIS — I25.118 CORONARY ARTERY DISEASE OF NATIVE ARTERY OF NATIVE HEART WITH STABLE ANGINA PECTORIS (HCC): ICD-10-CM

## 2023-05-16 PROCEDURE — 99214 OFFICE O/P EST MOD 30 MIN: CPT | Performed by: INTERNAL MEDICINE

## 2023-05-16 PROCEDURE — 1036F TOBACCO NON-USER: CPT | Performed by: INTERNAL MEDICINE

## 2023-05-16 PROCEDURE — 1123F ACP DISCUSS/DSCN MKR DOCD: CPT | Performed by: INTERNAL MEDICINE

## 2023-05-16 PROCEDURE — 1090F PRES/ABSN URINE INCON ASSESS: CPT | Performed by: INTERNAL MEDICINE

## 2023-05-16 PROCEDURE — G8417 CALC BMI ABV UP PARAM F/U: HCPCS | Performed by: INTERNAL MEDICINE

## 2023-05-16 PROCEDURE — G8428 CUR MEDS NOT DOCUMENT: HCPCS | Performed by: INTERNAL MEDICINE

## 2023-05-16 NOTE — PROGRESS NOTES
[address] [address] Ben Larson, [address]  PHONE: [phone]      23    NAME:  Jacquelyn Koenig  : 1933  MRN: [id number]       SUBJECTIVE:   Jacquelyn Koenig is a 80 y.o. female seen for a follow up visit regarding the following:     Chief Complaint   Patient presents with    Coronary Artery Disease         HPI:    No cp or esposito. No orthopnea or pnd. No palpitations or syncope. Past Medical History, Past Surgical History, Family history, Social History, and Medications were all reviewed with the patient today and updated as necessary.      Current Outpatient Medications   Medication Sig Dispense Refill    carvedilol (COREG) 6.25 MG tablet Take 1 tablet by mouth 2 times daily (with meals) 180 tablet 3    furosemide (LASIX) 40 MG tablet Take 1-2 tablets by mouth daily TAKE 1 TABLET BY MOUTH EVERY DAY (Patient taking differently: Take 1 tablet by mouth 2 times daily) 180 tablet 2    acetaminophen (TYLENOL) 500 MG tablet Take 1 tablet by mouth every 4 hours as needed      ascorbic acid (VITAMIN C) 500 MG tablet Take by mouth      aspirin 81 MG chewable tablet Take 1 tablet by mouth daily      atorvastatin (LIPITOR) 40 MG tablet Take 1 tablet by mouth daily      baclofen (LIORESAL) 10 MG tablet Take 1 tablet by mouth 2 times daily      vitamin D 25 MCG (1000 UT) CAPS Take by mouth daily      cyanocobalamin 1000 MCG tablet Take 1 tablet by mouth daily      diclofenac sodium (VOLTAREN) 1 % GEL Apply 1-2 g topically 3 times daily as needed      escitalopram (LEXAPRO) 10 MG tablet Take 1 tablet by mouth daily      insulin glargine (LANTUS SOLOSTAR) 100 UNIT/ML injection pen INJECT 20 UNITS UNDER THE SKIN DAILY      insulin lispro, 1 Unit Dial, 100 UNIT/ML SOPN Inject 10 Units into the skin After each meal,extra 2 units at night prn      linagliptin (TRADJENTA) 5 MG tablet Take 1 tablet by mouth daily      melatonin 3 MG TABS tablet Take by mouth      nitroGLYCERIN (NITROSTAT)

## 2023-10-18 RX ORDER — FUROSEMIDE 40 MG/1
TABLET ORAL
Qty: 180 TABLET | Refills: 2 | Status: SHIPPED | OUTPATIENT
Start: 2023-10-18

## 2023-11-21 ENCOUNTER — OFFICE VISIT (OUTPATIENT)
Age: 88
End: 2023-11-21
Payer: MEDICARE

## 2023-11-21 VITALS
SYSTOLIC BLOOD PRESSURE: 108 MMHG | BODY MASS INDEX: 35.61 KG/M2 | HEIGHT: 63 IN | HEART RATE: 60 BPM | WEIGHT: 201 LBS | DIASTOLIC BLOOD PRESSURE: 60 MMHG

## 2023-11-21 DIAGNOSIS — I50.22 CHRONIC SYSTOLIC HEART FAILURE (HCC): Primary | ICD-10-CM

## 2023-11-21 DIAGNOSIS — I25.118 CORONARY ARTERY DISEASE OF NATIVE ARTERY OF NATIVE HEART WITH STABLE ANGINA PECTORIS (HCC): ICD-10-CM

## 2023-11-21 DIAGNOSIS — E78.5 DYSLIPIDEMIA: ICD-10-CM

## 2023-11-21 DIAGNOSIS — R60.0 LOCALIZED EDEMA: ICD-10-CM

## 2023-11-21 PROCEDURE — G8484 FLU IMMUNIZE NO ADMIN: HCPCS | Performed by: INTERNAL MEDICINE

## 2023-11-21 PROCEDURE — 1036F TOBACCO NON-USER: CPT | Performed by: INTERNAL MEDICINE

## 2023-11-21 PROCEDURE — G8417 CALC BMI ABV UP PARAM F/U: HCPCS | Performed by: INTERNAL MEDICINE

## 2023-11-21 PROCEDURE — 99214 OFFICE O/P EST MOD 30 MIN: CPT | Performed by: INTERNAL MEDICINE

## 2023-11-21 PROCEDURE — 1123F ACP DISCUSS/DSCN MKR DOCD: CPT | Performed by: INTERNAL MEDICINE

## 2023-11-21 PROCEDURE — G8428 CUR MEDS NOT DOCUMENT: HCPCS | Performed by: INTERNAL MEDICINE

## 2023-11-21 PROCEDURE — 1090F PRES/ABSN URINE INCON ASSESS: CPT | Performed by: INTERNAL MEDICINE

## 2023-11-21 RX ORDER — ACETAMINOPHEN AND DEXTROMETHORPHAN HYDROBROMIDE 650; 20 MG/20ML; MG/20ML
SOLUTION ORAL
COMMUNITY

## 2023-11-21 NOTE — PROGRESS NOTES
[facility] CARDIOLOGY  [address] [facility], [address]  PHONE: [phone]      23    NAME:  Jamie Ray  : 1933  MRN: [id number]       SUBJECTIVE:   Jamie Ray is a 80 y.o. female seen for a follow up visit regarding the following:     Chief Complaint   Patient presents with    6 Month Follow-Up         HPI:    Occasional fleeting cp. Echols present as well. . No orthopnea or pnd. No palpitations or syncope. Past Medical History, Past Surgical History, Family history, Social History, and Medications were all reviewed with the patient today and updated as necessary.      Current Outpatient Medications   Medication Sig Dispense Refill    Acetaminophen-DM (DELSYM COUGH + SORE THROAT) 650-20 MG/20ML LIQD Take by mouth      furosemide (LASIX) 40 MG tablet TAKE 1-2 TABLETS BY MOUTH DAILY 180 tablet 2    [id number]-[id number]-[id number]-B Cmp-C-Biot (FOLIVANE-PLUS) CAPS Take 1 capsule by mouth daily 90 capsule 3    carvedilol (COREG) 6.25 MG tablet Take 1 tablet by mouth 2 times daily (with meals) 180 tablet 3    acetaminophen (TYLENOL) 500 MG tablet Take 1 tablet by mouth every 4 hours as needed      ascorbic acid (VITAMIN C) 500 MG tablet Take by mouth      aspirin 81 MG chewable tablet Take 1 tablet by mouth daily      atorvastatin (LIPITOR) 40 MG tablet Take 1 tablet by mouth daily      baclofen (LIORESAL) 10 MG tablet Take 1 tablet by mouth 2 times daily      vitamin D 25 MCG (1000 UT) CAPS Take by mouth daily      cyanocobalamin 1000 MCG tablet Take 1 tablet by mouth daily      diclofenac sodium (VOLTAREN) 1 % GEL Apply 1-2 g topically 3 times daily as needed      escitalopram (LEXAPRO) 10 MG tablet Take 1 tablet by mouth daily      insulin glargine (LANTUS SOLOSTAR) 100 UNIT/ML injection pen INJECT 20 UNITS UNDER THE SKIN DAILY      insulin lispro, 1 Unit Dial, 100 UNIT/ML SOPN Inject 10 Units into the skin After each meal,extra 2 units at night prn      melatonin 3 MG TABS tablet Take by

## 2024-02-21 ENCOUNTER — OFFICE VISIT (OUTPATIENT)
Age: 89
End: 2024-02-21

## 2024-02-21 VITALS
BODY MASS INDEX: 35.61 KG/M2 | SYSTOLIC BLOOD PRESSURE: 120 MMHG | HEART RATE: 62 BPM | HEIGHT: 63 IN | WEIGHT: 201 LBS | DIASTOLIC BLOOD PRESSURE: 64 MMHG

## 2024-02-21 DIAGNOSIS — I25.118 CORONARY ARTERY DISEASE OF NATIVE ARTERY OF NATIVE HEART WITH STABLE ANGINA PECTORIS (HCC): Primary | ICD-10-CM

## 2024-02-21 DIAGNOSIS — I50.22 CHRONIC SYSTOLIC HEART FAILURE (HCC): ICD-10-CM

## 2024-02-21 DIAGNOSIS — E78.5 DYSLIPIDEMIA: ICD-10-CM

## 2024-02-21 DIAGNOSIS — R60.0 LOCALIZED EDEMA: ICD-10-CM

## 2024-02-21 RX ORDER — IRON FUM,PS/FOLIC/BCOMP,C NO.9 125 MG-1MG
1 CAPSULE ORAL DAILY
Qty: 90 CAPSULE | Refills: 3 | Status: SHIPPED | OUTPATIENT
Start: 2024-02-21

## 2024-02-21 RX ORDER — NITROGLYCERIN 0.4 MG/1
0.4 TABLET SUBLINGUAL EVERY 5 MIN PRN
Qty: 25 TABLET | Refills: 11 | Status: SHIPPED | OUTPATIENT
Start: 2024-02-21

## 2024-02-21 NOTE — PROGRESS NOTES
[facility]  [address] [place] [address], [facility] [address]  [place] [address]  PHONE: [phone]      24    NAME:  Pau Mckenzie  : 1933  MRN: [id number]       SUBJECTIVE:   Pau Mckenzie is a 89 y.o. female seen for a follow up visit regarding the following:     Chief Complaint   Patient presents with    Coronary Artery Disease         HPI:    No cp or esposito. No orthopnea or pnd. No palpitations or syncope.      Past Medical History, Past Surgical History, Family history, Social History, and Medications were all reviewed with the patient today and updated as necessary.     Current Outpatient Medications   Medication Sig Dispense Refill    Acetaminophen-DM (DELSYM COUGH + SORE THROAT) 650-20 MG/20ML LIQD Take by mouth      furosemide (LASIX) 40 MG tablet TAKE 1-2 TABLETS BY MOUTH DAILY 180 tablet 2    [id number]-[id number]-[id number]-B Cmp-C-Biot (FOLIVANE-PLUS) CAPS Take 1 capsule by mouth daily 90 capsule 3    carvedilol (COREG) 6.25 MG tablet Take 1 tablet by mouth 2 times daily (with meals) 180 tablet 3    acetaminophen (TYLENOL) 500 MG tablet Take 1 tablet by mouth every 4 hours as needed      ascorbic acid (VITAMIN C) 500 MG tablet Take by mouth      aspirin 81 MG chewable tablet Take 1 tablet by mouth daily      atorvastatin (LIPITOR) 40 MG tablet Take 1 tablet by mouth daily      baclofen (LIORESAL) 10 MG tablet Take 1 tablet by mouth 2 times daily      vitamin D 25 MCG (1000 UT) CAPS Take by mouth daily      cyanocobalamin 1000 MCG tablet Take 1 tablet by mouth every other day      diclofenac sodium (VOLTAREN) 1 % GEL Apply 1-2 g topically 3 times daily as needed      escitalopram (LEXAPRO) 10 MG tablet Take 1 tablet by mouth daily      insulin glargine (LANTUS SOLOSTAR) 100 UNIT/ML injection pen [id number] UNITS UNDER THE SKIN DAILY      insulin lispro, 1 Unit Dial, 100 UNIT/ML SOPN Inject 10 Units into the skin After each meal,extra 2 units at night prn      melatonin 3 MG TABS tablet Take by mouth

## 2024-04-24 RX ORDER — CARVEDILOL 6.25 MG/1
6.25 TABLET ORAL 2 TIMES DAILY WITH MEALS
Qty: 180 TABLET | Refills: 3 | Status: SHIPPED | OUTPATIENT
Start: 2024-04-24

## 2024-04-24 NOTE — TELEPHONE ENCOUNTER
Requested Prescriptions     Pending Prescriptions Disp Refills    carvedilol (COREG) 6.25 MG tablet [Pharmacy Med Name: CARVEDILOL 6.25 MG TABLET] 180 tablet 3     Sig: TAKE 1 TABLET BY MOUTH TWICE A DAY WITH MEALS

## 2024-07-08 RX ORDER — FUROSEMIDE 40 MG/1
TABLET ORAL
Qty: 180 TABLET | Refills: 2 | Status: SHIPPED | OUTPATIENT
Start: 2024-07-08

## 2024-07-08 NOTE — TELEPHONE ENCOUNTER
Requested Prescriptions     Pending Prescriptions Disp Refills    furosemide (LASIX) 40 MG tablet [Pharmacy Med Name: FUROSEMIDE 40 MG TABLET] 180 tablet 2     Sig: TAKE 1 TO 2 TABLETS BY MOUTH DAILY        Verified rx. Last OV 2/21/24. Erx to pharm on file.

## 2024-10-23 ENCOUNTER — OFFICE VISIT (OUTPATIENT)
Age: 89
End: 2024-10-23
Payer: MEDICARE

## 2024-10-23 VITALS
WEIGHT: 200 LBS | SYSTOLIC BLOOD PRESSURE: 138 MMHG | HEART RATE: 66 BPM | BODY MASS INDEX: 35.44 KG/M2 | HEIGHT: 63 IN | DIASTOLIC BLOOD PRESSURE: 60 MMHG

## 2024-10-23 DIAGNOSIS — R60.0 LOCALIZED EDEMA: Primary | ICD-10-CM

## 2024-10-23 DIAGNOSIS — I50.22 CHRONIC SYSTOLIC HEART FAILURE (HCC): ICD-10-CM

## 2024-10-23 DIAGNOSIS — I25.118 CORONARY ARTERY DISEASE OF NATIVE ARTERY OF NATIVE HEART WITH STABLE ANGINA PECTORIS (HCC): ICD-10-CM

## 2024-10-23 DIAGNOSIS — E78.5 DYSLIPIDEMIA: ICD-10-CM

## 2024-10-23 PROCEDURE — 1090F PRES/ABSN URINE INCON ASSESS: CPT | Performed by: INTERNAL MEDICINE

## 2024-10-23 PROCEDURE — 99214 OFFICE O/P EST MOD 30 MIN: CPT | Performed by: INTERNAL MEDICINE

## 2024-10-23 PROCEDURE — 1036F TOBACCO NON-USER: CPT | Performed by: INTERNAL MEDICINE

## 2024-10-23 PROCEDURE — G8417 CALC BMI ABV UP PARAM F/U: HCPCS | Performed by: INTERNAL MEDICINE

## 2024-10-23 PROCEDURE — G8428 CUR MEDS NOT DOCUMENT: HCPCS | Performed by: INTERNAL MEDICINE

## 2024-10-23 PROCEDURE — 1123F ACP DISCUSS/DSCN MKR DOCD: CPT | Performed by: INTERNAL MEDICINE

## 2024-10-23 PROCEDURE — G8484 FLU IMMUNIZE NO ADMIN: HCPCS | Performed by: INTERNAL MEDICINE

## 2024-10-23 RX ORDER — ROPINIROLE 0.5 MG/1
0.5 TABLET, FILM COATED ORAL
COMMUNITY
Start: 2024-10-14

## 2024-10-23 RX ORDER — IRON FUM,PS/FOLIC/BCOMP,C NO.9 125 MG-1MG
1 CAPSULE ORAL DAILY
Qty: 90 CAPSULE | Refills: 3 | Status: SHIPPED | OUTPATIENT
Start: 2024-10-23

## 2024-10-23 RX ORDER — ALBUTEROL SULFATE 90 UG/1
2 INHALANT RESPIRATORY (INHALATION) EVERY 4 HOURS PRN
COMMUNITY
Start: 2024-09-23

## 2024-10-23 NOTE — PROGRESS NOTES
1 % GEL Apply 1-2 g topically 3 times daily as needed      insulin glargine (LANTUS SOLOSTAR) 100 UNIT/ML injection pen [id number] UNITS UNDER THE SKIN DAILY      insulin lispro, 1 Unit Dial, 100 UNIT/ML SOPN Inject 5 Units into the skin After each meal,extra 2 units at night prn      melatonin 3 MG TABS tablet Take by mouth      traZODone (DESYREL) 50 MG tablet TAKE 1 TABLET BY MOUTH EVERY DAY AT BEDTIME AS NEEDED FOR SLEEP       No current facility-administered medications for this visit.               Social History     Tobacco Use    Smoking status: Former     Current packs/day: 0.00     Types: Cigarettes     Quit date: 10/11/2011     Years since quittin.0    Smokeless tobacco: Never   Substance Use Topics    Alcohol use: No              PHYSICAL EXAM:   /60   Pulse 66   Ht 1.6 m (5' 3\")   Wt 90.7 kg (200 lb) Comment: per caregiver  BMI 35.43 kg/m²    Constitutional: Oriented to person, place, and time. Appears well-developed and well-nourished.   Head: Normocephalic and atraumatic.   Neck: Neck supple.   Cardiovascular: Normal rate and regular rhythm with no murmur -No JVP  Pulmonary/Chest: Breath sounds normal.   Abdominal: Soft.   Musculoskeletal: No edema.   Neurological: Alert and oriented to person, place, and time.   Skin: Skin is warm and dry.   Psychiatric: Normal mood and affect.   Vitals reviewed.      Wt Readings from Last 3 Encounters:   10/23/24 90.7 kg (200 lb)   24 91.2 kg (201 lb)   23 91.2 kg (201 lb)       Medical problems and test results were reviewed with the patient today.       ASSESSMENT and PLAN    1. Localized edema  Stable.Continue lasix      2. Dyslipidemia  Stable.Continue lipitor      3. Coronary artery disease of native artery of native heart with stable angina pectoris (HCC)  Stable.Continue asa      4. Chronic systolic heart failure (HCC)  Stable.Continue coreg  Ef normalized       Return in about 6 months (around 2025).         SHEMAR DALY,

## 2025-01-21 ENCOUNTER — TELEPHONE (OUTPATIENT)
Age: 89
End: 2025-01-21

## 2025-01-21 NOTE — TELEPHONE ENCOUNTER
Spoke to pt's grand daughter and wants to make sure pt is taking the correct dose of Lasix. States she is currently taking 40 mg once daily. Will forward to Dr. Santana for confirmation.

## 2025-01-21 NOTE — TELEPHONE ENCOUNTER
The rehab that she is in changed her medication and the family is not comfortable with that. Would like to speak to Dr. Santana about her medications.     Please call to discuss.

## 2025-01-22 NOTE — TELEPHONE ENCOUNTER
40 mg once a day of the Lasix is appropriate.  If swelling gets worse on that dose please let us know

## 2025-04-28 ENCOUNTER — TELEPHONE (OUTPATIENT)
Age: 89
End: 2025-04-28

## 2025-04-28 ENCOUNTER — OFFICE VISIT (OUTPATIENT)
Age: 89
End: 2025-04-28
Payer: MEDICARE

## 2025-04-28 VITALS
BODY MASS INDEX: 35.43 KG/M2 | HEART RATE: 71 BPM | HEIGHT: 63 IN | DIASTOLIC BLOOD PRESSURE: 70 MMHG | SYSTOLIC BLOOD PRESSURE: 150 MMHG

## 2025-04-28 DIAGNOSIS — I50.22 CHRONIC SYSTOLIC HEART FAILURE (HCC): ICD-10-CM

## 2025-04-28 DIAGNOSIS — I25.118 CORONARY ARTERY DISEASE OF NATIVE ARTERY OF NATIVE HEART WITH STABLE ANGINA PECTORIS: Primary | ICD-10-CM

## 2025-04-28 DIAGNOSIS — E78.5 DYSLIPIDEMIA: ICD-10-CM

## 2025-04-28 PROCEDURE — G8428 CUR MEDS NOT DOCUMENT: HCPCS | Performed by: INTERNAL MEDICINE

## 2025-04-28 PROCEDURE — 1126F AMNT PAIN NOTED NONE PRSNT: CPT | Performed by: INTERNAL MEDICINE

## 2025-04-28 PROCEDURE — 93000 ELECTROCARDIOGRAM COMPLETE: CPT | Performed by: INTERNAL MEDICINE

## 2025-04-28 PROCEDURE — G8417 CALC BMI ABV UP PARAM F/U: HCPCS | Performed by: INTERNAL MEDICINE

## 2025-04-28 PROCEDURE — 99214 OFFICE O/P EST MOD 30 MIN: CPT | Performed by: INTERNAL MEDICINE

## 2025-04-28 PROCEDURE — 1090F PRES/ABSN URINE INCON ASSESS: CPT | Performed by: INTERNAL MEDICINE

## 2025-04-28 PROCEDURE — 1036F TOBACCO NON-USER: CPT | Performed by: INTERNAL MEDICINE

## 2025-04-28 PROCEDURE — 1123F ACP DISCUSS/DSCN MKR DOCD: CPT | Performed by: INTERNAL MEDICINE

## 2025-04-28 RX ORDER — ALLOPURINOL 100 MG/1
100 TABLET ORAL DAILY
COMMUNITY
Start: 2025-02-18

## 2025-04-28 RX ORDER — INDOMETHACIN 50 MG/1
50 CAPSULE ORAL 2 TIMES DAILY
COMMUNITY
Start: 2025-02-12

## 2025-04-28 RX ORDER — NITROGLYCERIN 0.4 MG/1
0.4 TABLET SUBLINGUAL EVERY 5 MIN PRN
Qty: 25 TABLET | Refills: 11 | Status: SHIPPED | OUTPATIENT
Start: 2025-04-28

## 2025-04-28 RX ORDER — VALSARTAN 80 MG/1
80 TABLET ORAL DAILY
Qty: 30 TABLET | Refills: 5 | Status: SHIPPED | OUTPATIENT
Start: 2025-04-28

## 2025-04-28 RX ORDER — CETIRIZINE HYDROCHLORIDE 10 MG/1
10 TABLET ORAL DAILY
COMMUNITY
Start: 2025-04-23

## 2025-04-28 RX ORDER — FUROSEMIDE 40 MG/1
40 TABLET ORAL SEE ADMIN INSTRUCTIONS
Qty: 180 TABLET | Refills: 2 | Status: SHIPPED | OUTPATIENT
Start: 2025-04-28

## 2025-04-28 RX ORDER — FERROUS SULFATE 325(65) MG
325 TABLET ORAL
COMMUNITY

## 2025-04-28 RX ORDER — IRON FUM,PS/FOLIC ACID/VITC/B3 125-1-40-3
1 CAPSULE ORAL DAILY
Qty: 30 CAPSULE | Refills: 11 | Status: SHIPPED | OUTPATIENT
Start: 2025-04-28

## 2025-04-28 NOTE — PROGRESS NOTES
[facility] CARDIOLOGY  [address] [place] [address], [place] [address]  PHONE: [phone]      25    NAME:  Pau Mckenzie  : 1933  MRN: [id number]       SUBJECTIVE:   Pau Mckenzie is a 89 y.o. female seen for a follow up visit regarding the following:     No chief complaint on file.        HPI:    No cp or esposito. No orthopnea or pnd. No palpitations or syncope.      Past Medical History, Past Surgical History, Family history, Social History, and Medications were all reviewed with the patient today and updated as necessary.     Current Outpatient Medications   Medication Sig Dispense Refill    cetirizine (ZYRTEC) 10 MG tablet Take 1 tablet by mouth daily      allopurinol (ZYLOPRIM) 100 MG tablet Take 1 tablet by mouth daily      indomethacin (INDOCIN) 50 MG capsule Take 1 capsule by mouth 2 times daily      ferrous sulfate (IRON 325) 325 (65 Fe) MG tablet Take 1 tablet by mouth Daily with lunch      sertraline (ZOLOFT) 50 MG tablet Take 3 tablets by mouth daily      rOPINIRole (REQUIP) 0.5 MG tablet Take 1 tablet by mouth at bedtime      albuterol sulfate HFA (PROVENTIL;VENTOLIN;PROAIR) 108 (90 Base) MCG/ACT inhaler Inhale 2 puffs into the lungs every 4 hours as needed      [id number] Cmp-C-Biot (FOLIVANE-PLUS) CAPS Take 1 capsule by mouth daily 90 capsule 3    furosemide (LASIX) 40 MG tablet TAKE 1 TO 2 TABLETS BY MOUTH DAILY (Patient taking differently: Take 1 tablet by mouth daily) 180 tablet 2    carvedilol (COREG) 6.25 MG tablet TAKE 1 TABLET BY MOUTH TWICE A DAY WITH MEALS 180 tablet 3    nitroGLYCERIN (NITROSTAT) 0.4 MG SL tablet Place 1 tablet under the tongue every 5 minutes as needed for Chest pain 25 tablet 11    acetaminophen (TYLENOL) 500 MG tablet Take 1 tablet by mouth every 4 hours as needed      ascorbic acid (VITAMIN C) 500 MG tablet Take by mouth      aspirin 81 MG chewable tablet Take 1 tablet by mouth daily      atorvastatin (LIPITOR) 40 MG tablet Take 1 tablet by mouth

## 2025-05-15 DIAGNOSIS — I10 ESSENTIAL (PRIMARY) HYPERTENSION: ICD-10-CM

## 2025-05-15 RX ORDER — CARVEDILOL 6.25 MG/1
6.25 TABLET ORAL 2 TIMES DAILY
Qty: 180 TABLET | Refills: 3 | Status: SHIPPED | OUTPATIENT
Start: 2025-05-15

## 2025-05-15 NOTE — TELEPHONE ENCOUNTER
Requested Prescriptions     Pending Prescriptions Disp Refills    carvedilol (COREG) 6.25 MG tablet [Pharmacy Med Name: CARVEDILOL 6.25 MG TABLET] 180 tablet 3     Sig: TAKE 1 TABLET BY MOUTH 2 TIMES A DAY     Verified rx. Last OV 4/28/25. Erx to pharm on file.